# Patient Record
Sex: MALE | Race: WHITE | NOT HISPANIC OR LATINO | Employment: FULL TIME | URBAN - METROPOLITAN AREA
[De-identification: names, ages, dates, MRNs, and addresses within clinical notes are randomized per-mention and may not be internally consistent; named-entity substitution may affect disease eponyms.]

---

## 2021-01-09 ENCOUNTER — APPOINTMENT (OUTPATIENT)
Dept: CT IMAGING | Facility: CLINIC | Age: 26
DRG: 439 | End: 2021-01-09
Attending: HOSPITALIST
Payer: COMMERCIAL

## 2021-01-09 ENCOUNTER — APPOINTMENT (OUTPATIENT)
Dept: ULTRASOUND IMAGING | Facility: CLINIC | Age: 26
DRG: 439 | End: 2021-01-09
Attending: EMERGENCY MEDICINE
Payer: COMMERCIAL

## 2021-01-09 ENCOUNTER — HOSPITAL ENCOUNTER (INPATIENT)
Facility: CLINIC | Age: 26
LOS: 3 days | Discharge: HOME | DRG: 439 | End: 2021-01-12
Attending: EMERGENCY MEDICINE | Admitting: HOSPITALIST
Payer: COMMERCIAL

## 2021-01-09 DIAGNOSIS — K85.90 ACUTE PANCREATITIS, UNSPECIFIED COMPLICATION STATUS, UNSPECIFIED PANCREATITIS TYPE: ICD-10-CM

## 2021-01-09 DIAGNOSIS — F32.2 CURRENT SEVERE EPISODE OF MAJOR DEPRESSIVE DISORDER WITHOUT PSYCHOTIC FEATURES WITHOUT PRIOR EPISODE (H): Primary | ICD-10-CM

## 2021-01-09 LAB
ALBUMIN SERPL-MCNC: 4.6 G/DL (ref 3.4–5)
ALBUMIN UR-MCNC: NEGATIVE MG/DL
ALP SERPL-CCNC: 126 U/L (ref 40–150)
ALT SERPL W P-5'-P-CCNC: 145 U/L (ref 0–70)
ANION GAP SERPL CALCULATED.3IONS-SCNC: 6 MMOL/L (ref 3–14)
APPEARANCE UR: CLEAR
AST SERPL W P-5'-P-CCNC: 126 U/L (ref 0–45)
BASOPHILS # BLD AUTO: 0.1 10E9/L (ref 0–0.2)
BASOPHILS NFR BLD AUTO: 0.5 %
BILIRUB SERPL-MCNC: 0.7 MG/DL (ref 0.2–1.3)
BILIRUB UR QL STRIP: NEGATIVE
BUN SERPL-MCNC: 8 MG/DL (ref 7–30)
CALCIUM SERPL-MCNC: 10.2 MG/DL (ref 8.5–10.1)
CHLORIDE SERPL-SCNC: 102 MMOL/L (ref 94–109)
CO2 SERPL-SCNC: 31 MMOL/L (ref 20–32)
COLOR UR AUTO: YELLOW
CREAT SERPL-MCNC: 0.82 MG/DL (ref 0.66–1.25)
DIFFERENTIAL METHOD BLD: ABNORMAL
EOSINOPHIL # BLD AUTO: 0 10E9/L (ref 0–0.7)
EOSINOPHIL NFR BLD AUTO: 0 %
ERYTHROCYTE [DISTWIDTH] IN BLOOD BY AUTOMATED COUNT: 11.7 % (ref 10–15)
FLUAV RNA RESP QL NAA+PROBE: NEGATIVE
FLUBV RNA RESP QL NAA+PROBE: NEGATIVE
GFR SERPL CREATININE-BSD FRML MDRD: >90 ML/MIN/{1.73_M2}
GLUCOSE SERPL-MCNC: 129 MG/DL (ref 70–99)
GLUCOSE UR STRIP-MCNC: NEGATIVE MG/DL
HCT VFR BLD AUTO: 46.8 % (ref 40–53)
HGB BLD-MCNC: 16.1 G/DL (ref 13.3–17.7)
HGB UR QL STRIP: NEGATIVE
HYALINE CASTS #/AREA URNS LPF: 1 /LPF (ref 0–2)
IMM GRANULOCYTES # BLD: 0 10E9/L (ref 0–0.4)
IMM GRANULOCYTES NFR BLD: 0.3 %
KETONES UR STRIP-MCNC: 10 MG/DL
LABORATORY COMMENT REPORT: NORMAL
LEUKOCYTE ESTERASE UR QL STRIP: NEGATIVE
LIPASE SERPL-CCNC: 6918 U/L (ref 73–393)
LYMPHOCYTES # BLD AUTO: 1 10E9/L (ref 0.8–5.3)
LYMPHOCYTES NFR BLD AUTO: 7.8 %
MAGNESIUM SERPL-MCNC: 1.6 MG/DL (ref 1.6–2.3)
MCH RBC QN AUTO: 33.8 PG (ref 26.5–33)
MCHC RBC AUTO-ENTMCNC: 34.4 G/DL (ref 31.5–36.5)
MCV RBC AUTO: 98 FL (ref 78–100)
MONOCYTES # BLD AUTO: 0.5 10E9/L (ref 0–1.3)
MONOCYTES NFR BLD AUTO: 4.4 %
MUCOUS THREADS #/AREA URNS LPF: PRESENT /LPF
NEUTROPHILS # BLD AUTO: 10.7 10E9/L (ref 1.6–8.3)
NEUTROPHILS NFR BLD AUTO: 87 %
NITRATE UR QL: NEGATIVE
NRBC # BLD AUTO: 0 10*3/UL
NRBC BLD AUTO-RTO: 0 /100
PH UR STRIP: 6 PH (ref 5–7)
PHOSPHATE SERPL-MCNC: 4.2 MG/DL (ref 2.5–4.5)
PLATELET # BLD AUTO: 272 10E9/L (ref 150–450)
POTASSIUM SERPL-SCNC: 4.3 MMOL/L (ref 3.4–5.3)
PROT SERPL-MCNC: 8 G/DL (ref 6.8–8.8)
RBC # BLD AUTO: 4.77 10E12/L (ref 4.4–5.9)
RBC #/AREA URNS AUTO: <1 /HPF (ref 0–2)
RSV RNA SPEC QL NAA+PROBE: NORMAL
SARS-COV-2 RNA RESP QL NAA+PROBE: NEGATIVE
SODIUM SERPL-SCNC: 139 MMOL/L (ref 133–144)
SOURCE: ABNORMAL
SP GR UR STRIP: 1.01 (ref 1–1.03)
SPECIMEN SOURCE: NORMAL
UROBILINOGEN UR STRIP-MCNC: 0 MG/DL (ref 0–2)
WBC # BLD AUTO: 12.3 10E9/L (ref 4–11)
WBC #/AREA URNS AUTO: 0 /HPF (ref 0–5)

## 2021-01-09 PROCEDURE — 250N000009 HC RX 250: Performed by: EMERGENCY MEDICINE

## 2021-01-09 PROCEDURE — 96375 TX/PRO/DX INJ NEW DRUG ADDON: CPT

## 2021-01-09 PROCEDURE — 99222 1ST HOSP IP/OBS MODERATE 55: CPT | Performed by: HOSPITALIST

## 2021-01-09 PROCEDURE — 96361 HYDRATE IV INFUSION ADD-ON: CPT

## 2021-01-09 PROCEDURE — 83690 ASSAY OF LIPASE: CPT | Performed by: EMERGENCY MEDICINE

## 2021-01-09 PROCEDURE — 87636 SARSCOV2 & INF A&B AMP PRB: CPT | Performed by: EMERGENCY MEDICINE

## 2021-01-09 PROCEDURE — C9803 HOPD COVID-19 SPEC COLLECT: HCPCS

## 2021-01-09 PROCEDURE — 99285 EMERGENCY DEPT VISIT HI MDM: CPT | Mod: 25

## 2021-01-09 PROCEDURE — 84100 ASSAY OF PHOSPHORUS: CPT | Performed by: HOSPITALIST

## 2021-01-09 PROCEDURE — 80053 COMPREHEN METABOLIC PANEL: CPT | Performed by: EMERGENCY MEDICINE

## 2021-01-09 PROCEDURE — 96374 THER/PROPH/DIAG INJ IV PUSH: CPT

## 2021-01-09 PROCEDURE — 258N000003 HC RX IP 258 OP 636: Performed by: HOSPITALIST

## 2021-01-09 PROCEDURE — 250N000013 HC RX MED GY IP 250 OP 250 PS 637: Performed by: EMERGENCY MEDICINE

## 2021-01-09 PROCEDURE — 96376 TX/PRO/DX INJ SAME DRUG ADON: CPT

## 2021-01-09 PROCEDURE — 76705 ECHO EXAM OF ABDOMEN: CPT

## 2021-01-09 PROCEDURE — 81001 URINALYSIS AUTO W/SCOPE: CPT | Performed by: EMERGENCY MEDICINE

## 2021-01-09 PROCEDURE — 74177 CT ABD & PELVIS W/CONTRAST: CPT

## 2021-01-09 PROCEDURE — 250N000011 HC RX IP 250 OP 636: Performed by: HOSPITALIST

## 2021-01-09 PROCEDURE — 36415 COLL VENOUS BLD VENIPUNCTURE: CPT | Performed by: HOSPITALIST

## 2021-01-09 PROCEDURE — 99207 PR CDG-CODE CATEGORY CHANGED: CPT | Performed by: HOSPITALIST

## 2021-01-09 PROCEDURE — 85025 COMPLETE CBC W/AUTO DIFF WBC: CPT | Performed by: EMERGENCY MEDICINE

## 2021-01-09 PROCEDURE — 250N000009 HC RX 250: Performed by: HOSPITALIST

## 2021-01-09 PROCEDURE — 120N000001 HC R&B MED SURG/OB

## 2021-01-09 PROCEDURE — 83735 ASSAY OF MAGNESIUM: CPT | Performed by: HOSPITALIST

## 2021-01-09 PROCEDURE — HZ2ZZZZ DETOXIFICATION SERVICES FOR SUBSTANCE ABUSE TREATMENT: ICD-10-PCS | Performed by: HOSPITALIST

## 2021-01-09 PROCEDURE — 250N000011 HC RX IP 250 OP 636: Performed by: EMERGENCY MEDICINE

## 2021-01-09 PROCEDURE — 258N000003 HC RX IP 258 OP 636: Performed by: EMERGENCY MEDICINE

## 2021-01-09 PROCEDURE — 250N000013 HC RX MED GY IP 250 OP 250 PS 637: Performed by: HOSPITALIST

## 2021-01-09 RX ORDER — OLANZAPINE 5 MG/1
5-10 TABLET, ORALLY DISINTEGRATING ORAL EVERY 6 HOURS PRN
Status: DISCONTINUED | OUTPATIENT
Start: 2021-01-09 | End: 2021-01-12 | Stop reason: HOSPADM

## 2021-01-09 RX ORDER — HYDROCODONE BITARTRATE AND ACETAMINOPHEN 5; 325 MG/1; MG/1
1-2 TABLET ORAL EVERY 4 HOURS PRN
Status: DISCONTINUED | OUTPATIENT
Start: 2021-01-09 | End: 2021-01-12 | Stop reason: HOSPADM

## 2021-01-09 RX ORDER — FOLIC ACID 1 MG/1
1 TABLET ORAL DAILY
Status: DISCONTINUED | OUTPATIENT
Start: 2021-01-10 | End: 2021-01-12 | Stop reason: HOSPADM

## 2021-01-09 RX ORDER — NALOXONE HYDROCHLORIDE 0.4 MG/ML
0.4 INJECTION, SOLUTION INTRAMUSCULAR; INTRAVENOUS; SUBCUTANEOUS
Status: DISCONTINUED | OUTPATIENT
Start: 2021-01-09 | End: 2021-01-12 | Stop reason: HOSPADM

## 2021-01-09 RX ORDER — NALOXONE HYDROCHLORIDE 0.4 MG/ML
0.2 INJECTION, SOLUTION INTRAMUSCULAR; INTRAVENOUS; SUBCUTANEOUS
Status: DISCONTINUED | OUTPATIENT
Start: 2021-01-09 | End: 2021-01-12 | Stop reason: HOSPADM

## 2021-01-09 RX ORDER — SODIUM CHLORIDE 9 MG/ML
INJECTION, SOLUTION INTRAVENOUS CONTINUOUS
Status: DISCONTINUED | OUTPATIENT
Start: 2021-01-09 | End: 2021-01-09

## 2021-01-09 RX ORDER — ACETAMINOPHEN 325 MG/1
650 TABLET ORAL EVERY 4 HOURS PRN
Status: DISCONTINUED | OUTPATIENT
Start: 2021-01-09 | End: 2021-01-11

## 2021-01-09 RX ORDER — LANOLIN ALCOHOL/MO/W.PET/CERES
100 CREAM (GRAM) TOPICAL 3 TIMES DAILY
Status: DISCONTINUED | OUTPATIENT
Start: 2021-01-11 | End: 2021-01-12 | Stop reason: HOSPADM

## 2021-01-09 RX ORDER — IOPAMIDOL 755 MG/ML
81 INJECTION, SOLUTION INTRAVASCULAR ONCE
Status: COMPLETED | OUTPATIENT
Start: 2021-01-09 | End: 2021-01-09

## 2021-01-09 RX ORDER — PROCHLORPERAZINE MALEATE 5 MG
10 TABLET ORAL EVERY 6 HOURS PRN
Status: DISCONTINUED | OUTPATIENT
Start: 2021-01-09 | End: 2021-01-12 | Stop reason: HOSPADM

## 2021-01-09 RX ORDER — PROCHLORPERAZINE 25 MG
25 SUPPOSITORY, RECTAL RECTAL EVERY 12 HOURS PRN
Status: DISCONTINUED | OUTPATIENT
Start: 2021-01-09 | End: 2021-01-12 | Stop reason: HOSPADM

## 2021-01-09 RX ORDER — MORPHINE SULFATE 4 MG/ML
4 INJECTION, SOLUTION INTRAMUSCULAR; INTRAVENOUS ONCE
Status: COMPLETED | OUTPATIENT
Start: 2021-01-09 | End: 2021-01-09

## 2021-01-09 RX ORDER — HYDROMORPHONE HYDROCHLORIDE 1 MG/ML
0.3 INJECTION, SOLUTION INTRAMUSCULAR; INTRAVENOUS; SUBCUTANEOUS
Status: DISCONTINUED | OUTPATIENT
Start: 2021-01-09 | End: 2021-01-09

## 2021-01-09 RX ORDER — AMOXICILLIN 250 MG
2 CAPSULE ORAL 2 TIMES DAILY PRN
Status: DISCONTINUED | OUTPATIENT
Start: 2021-01-09 | End: 2021-01-12 | Stop reason: HOSPADM

## 2021-01-09 RX ORDER — POLYETHYLENE GLYCOL 3350 17 G/17G
17 POWDER, FOR SOLUTION ORAL DAILY PRN
Status: DISCONTINUED | OUTPATIENT
Start: 2021-01-09 | End: 2021-01-12 | Stop reason: HOSPADM

## 2021-01-09 RX ORDER — SODIUM CHLORIDE 9 MG/ML
1000 INJECTION, SOLUTION INTRAVENOUS CONTINUOUS
Status: DISCONTINUED | OUTPATIENT
Start: 2021-01-09 | End: 2021-01-09

## 2021-01-09 RX ORDER — ONDANSETRON 2 MG/ML
4 INJECTION INTRAMUSCULAR; INTRAVENOUS EVERY 6 HOURS PRN
Status: DISCONTINUED | OUTPATIENT
Start: 2021-01-09 | End: 2021-01-12 | Stop reason: HOSPADM

## 2021-01-09 RX ORDER — ACETAMINOPHEN 650 MG/1
650 SUPPOSITORY RECTAL EVERY 4 HOURS PRN
Status: DISCONTINUED | OUTPATIENT
Start: 2021-01-09 | End: 2021-01-12 | Stop reason: HOSPADM

## 2021-01-09 RX ORDER — SODIUM CHLORIDE 9 MG/ML
INJECTION, SOLUTION INTRAVENOUS CONTINUOUS
Status: DISCONTINUED | OUTPATIENT
Start: 2021-01-09 | End: 2021-01-12 | Stop reason: HOSPADM

## 2021-01-09 RX ORDER — LORAZEPAM 2 MG/ML
1-2 INJECTION INTRAMUSCULAR EVERY 30 MIN PRN
Status: DISCONTINUED | OUTPATIENT
Start: 2021-01-09 | End: 2021-01-12 | Stop reason: HOSPADM

## 2021-01-09 RX ORDER — FLUMAZENIL 0.1 MG/ML
0.2 INJECTION, SOLUTION INTRAVENOUS
Status: DISCONTINUED | OUTPATIENT
Start: 2021-01-09 | End: 2021-01-12 | Stop reason: HOSPADM

## 2021-01-09 RX ORDER — LANOLIN ALCOHOL/MO/W.PET/CERES
200 CREAM (GRAM) TOPICAL 3 TIMES DAILY
Status: COMPLETED | OUTPATIENT
Start: 2021-01-09 | End: 2021-01-11

## 2021-01-09 RX ORDER — LIDOCAINE 40 MG/G
CREAM TOPICAL
Status: DISCONTINUED | OUTPATIENT
Start: 2021-01-09 | End: 2021-01-12 | Stop reason: HOSPADM

## 2021-01-09 RX ORDER — HALOPERIDOL 5 MG/ML
2.5-5 INJECTION INTRAMUSCULAR EVERY 6 HOURS PRN
Status: DISCONTINUED | OUTPATIENT
Start: 2021-01-09 | End: 2021-01-12 | Stop reason: HOSPADM

## 2021-01-09 RX ORDER — AMOXICILLIN 250 MG
1 CAPSULE ORAL 2 TIMES DAILY PRN
Status: DISCONTINUED | OUTPATIENT
Start: 2021-01-09 | End: 2021-01-12 | Stop reason: HOSPADM

## 2021-01-09 RX ORDER — LANOLIN ALCOHOL/MO/W.PET/CERES
100 CREAM (GRAM) TOPICAL DAILY
Status: DISCONTINUED | OUTPATIENT
Start: 2021-01-17 | End: 2021-01-12 | Stop reason: HOSPADM

## 2021-01-09 RX ORDER — LORAZEPAM 1 MG/1
1-2 TABLET ORAL EVERY 30 MIN PRN
Status: DISCONTINUED | OUTPATIENT
Start: 2021-01-09 | End: 2021-01-12 | Stop reason: HOSPADM

## 2021-01-09 RX ORDER — MULTIPLE VITAMINS W/ MINERALS TAB 9MG-400MCG
1 TAB ORAL DAILY
Status: DISCONTINUED | OUTPATIENT
Start: 2021-01-10 | End: 2021-01-12 | Stop reason: HOSPADM

## 2021-01-09 RX ORDER — ONDANSETRON 4 MG/1
4 TABLET, ORALLY DISINTEGRATING ORAL EVERY 6 HOURS PRN
Status: DISCONTINUED | OUTPATIENT
Start: 2021-01-09 | End: 2021-01-12 | Stop reason: HOSPADM

## 2021-01-09 RX ORDER — HYDROMORPHONE HYDROCHLORIDE 1 MG/ML
0.5 INJECTION, SOLUTION INTRAMUSCULAR; INTRAVENOUS; SUBCUTANEOUS EVERY 10 MIN PRN
Status: DISCONTINUED | OUTPATIENT
Start: 2021-01-09 | End: 2021-01-09

## 2021-01-09 RX ORDER — HYDROMORPHONE HYDROCHLORIDE 1 MG/ML
.5-1 INJECTION, SOLUTION INTRAMUSCULAR; INTRAVENOUS; SUBCUTANEOUS
Status: DISPENSED | OUTPATIENT
Start: 2021-01-09 | End: 2021-01-10

## 2021-01-09 RX ADMIN — SODIUM CHLORIDE 1000 ML: 9 INJECTION, SOLUTION INTRAVENOUS at 14:34

## 2021-01-09 RX ADMIN — HYDROMORPHONE HYDROCHLORIDE 0.3 MG: 1 INJECTION, SOLUTION INTRAMUSCULAR; INTRAVENOUS; SUBCUTANEOUS at 20:15

## 2021-01-09 RX ADMIN — ONDANSETRON 4 MG: 2 INJECTION INTRAMUSCULAR; INTRAVENOUS at 19:41

## 2021-01-09 RX ADMIN — HYDROMORPHONE HYDROCHLORIDE 0.5 MG: 1 INJECTION, SOLUTION INTRAMUSCULAR; INTRAVENOUS; SUBCUTANEOUS at 21:36

## 2021-01-09 RX ADMIN — LIDOCAINE HYDROCHLORIDE 30 ML: 20 SOLUTION ORAL; TOPICAL at 14:37

## 2021-01-09 RX ADMIN — IOPAMIDOL 81 ML: 755 INJECTION, SOLUTION INTRAVENOUS at 21:48

## 2021-01-09 RX ADMIN — SODIUM CHLORIDE: 9 INJECTION, SOLUTION INTRAVENOUS at 19:19

## 2021-01-09 RX ADMIN — HYDROMORPHONE HYDROCHLORIDE 0.5 MG: 1 INJECTION, SOLUTION INTRAMUSCULAR; INTRAVENOUS; SUBCUTANEOUS at 15:44

## 2021-01-09 RX ADMIN — SODIUM CHLORIDE 63 ML: 9 INJECTION, SOLUTION INTRAVENOUS at 21:48

## 2021-01-09 RX ADMIN — HYDROMORPHONE HYDROCHLORIDE 0.5 MG: 1 INJECTION, SOLUTION INTRAMUSCULAR; INTRAVENOUS; SUBCUTANEOUS at 17:19

## 2021-01-09 RX ADMIN — SODIUM CHLORIDE: 9 INJECTION, SOLUTION INTRAVENOUS at 18:26

## 2021-01-09 RX ADMIN — THIAMINE HCL TAB 100 MG 200 MG: 100 TAB at 21:36

## 2021-01-09 RX ADMIN — MORPHINE SULFATE 4 MG: 4 INJECTION, SOLUTION INTRAMUSCULAR; INTRAVENOUS at 15:12

## 2021-01-09 RX ADMIN — HYDROCODONE BITARTRATE AND ACETAMINOPHEN 1 TABLET: 5; 325 TABLET ORAL at 19:22

## 2021-01-09 RX ADMIN — SODIUM CHLORIDE 1000 ML: 9 INJECTION, SOLUTION INTRAVENOUS at 15:48

## 2021-01-09 ASSESSMENT — ENCOUNTER SYMPTOMS
SHORTNESS OF BREATH: 1
HEMATURIA: 0
DIARRHEA: 1
LIGHT-HEADEDNESS: 0
DYSURIA: 0
FREQUENCY: 0
NAUSEA: 1
VOMITING: 1
FEVER: 0
ABDOMINAL PAIN: 1

## 2021-01-09 ASSESSMENT — MIFFLIN-ST. JEOR: SCORE: 1701.14

## 2021-01-09 ASSESSMENT — ACTIVITIES OF DAILY LIVING (ADL): ADLS_ACUITY_SCORE: 17

## 2021-01-09 NOTE — ED NOTES
[facility]  ED Nurse Handoff Report    ED Chief complaint: Abdominal Pain      ED Diagnosis:   Final diagnoses:   Acute pancreatitis, unspecified complication status, unspecified pancreatitis type       Code Status: Full Code    Allergies: No Known Allergies    Patient Story: arrived to ED w/ vomiting and abdominal pain  Focused Assessment:  VSS on R/A. Epigastric pain and vomiting since Thursday, getting worse each day. Respiratory, Neuro and Cardiac exams WNL. Pt states he has been drinking more than usual over the last few days. Plan to admit for pancreatitis and increased LFT's.    Treatments and/or interventions provided: 2 L NS bolus, 4 mg morphine IV x1, 0.5 mg dilaudid IV x2, GI cocktail x1  Patient's response to treatments and/or interventions: pain somewhat reduced    To be done/followed up on inpatient unit:  NEEDS UA    Does this patient have any cognitive concerns?: NA    Activity level - Baseline/Home:  Independent  Activity Level - Current:   Independent    Patient's Preferred language: English   Needed?: No    Isolation: None  Infection: Not Applicable  Patient tested for COVID 19 prior to admission: YES  Bariatric?: No    Vital Signs:   Vitals:    01/09/21 1545 01/09/21 1550 01/09/21 1555 01/09/21 1600   BP:       Pulse:       Resp:       Temp:       TempSrc:       SpO2: 100% 100% 100% 100%   Weight:       Height:           Cardiac Rhythm:     Was the PSS-3 completed:   Yes  What interventions are required if any?               Family Comments: NA  OBS brochure/video discussed/provided to patient/family: N/A              Name of person given brochure if not patient: NA              Relationship to patient: NA    For the majority of the shift this patient's behavior was Green.   Behavioral interventions performed were NA.    ED NURSE PHONE NUMBER: [phone]

## 2021-01-09 NOTE — H&P
[facility]    History and Physical - Hospitalist Service       Date of Admission:  1/9/2021    Assessment & Plan   Lloyd Dillon is a 25 year old male with minimal past medical history who presented to the ER with abdominal pain, nausea and vomiting.  He has been found to have acute pancreatitis likely secondary to alcohol abuse.      Acute pancreatitis  Likely in the context of alcohol use which has increased to 4-5 days a week in the past several months during covid.  Reports 3-5 drinks per night when he is drinking and states he has essentially nothing to do.  Denies thoughts of self harm or suicidal ideation but has felt down/depressed at times.  Denies any other drug or substance use.  On admission lipase 6918 and  .  Ultrasound formal reading pending.  Slight leukocytosis of 12.3.  Calcium 10.2. Given morphine and dilaudid in ED as well as IVF with some improvement of symptoms.  - Continue IV fluid with NS at 150/hr -- increased to 200/hr given low output so far  - monitor in/out - ensure adequate urine output  - ice chips/sips of water for now, can advance to clears if tolerated later tonight or in the morning  - As needed analgesia and antiemetics  - Discussed and encouraged alcohol cessation - patient agreeable to psychiatry consultation (ordered)  - with concern of alcohol use for most days a week for last several months, will add CIWA protocol and telemetry  --- 21:15 - continued/worsening pain, will order STAT CT abdo/pelvis with contrast    COVID-19 asymptomatic screening  - PCR pending from ED, no indication for special precautions      Diet: NPO for Medical/Clinical Reasons Except for: Ice Chips    DVT Prophylaxis: Pneumatic Compression Devices and Ambulate every shift  Griffith Catheter: not present  Code Status:   Full Code         Disposition Plan   Expected discharge: 2-3 days pending improvement of symptoms and toleration of diet  Entered: Bebo Healy,  MD 01/09/2021, 4:38 PM     The patient's care was discussed with the Bedside Nurse, Patient and ED MD.    Bebo Healy MD  [facility]  Contact information available via [facility] Paging/Directory      ______________________________________________________________________    Chief Complaint   Abdominal pain, nausea, and vomiting    History is obtained from the patient    History of Present Illness   Lloyd Dillon is a 25 year old male with minimal past medical history who presented to the ED with abdominal pain, nausea, and vomiting.  3-4 days ago patient noted sharp epigastric pain associated with nausea and some intermittent vomiting.  At first he thought this was due to food poisoning in relation to eating some pasta at home.  He subsequently developed a couple bouts of loose stool but nothing persistent.  Pain worsens when he lies down and does not improve after vomiting/dry heaving.  He does endorse drinking alcohol up to 4-5 days a week since COVID began as he has nothing to do.  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  He denies suicidal ideation but has been feeling down at times over the past several months.  He denies fevers, chills, cough, shortness of breath (although he did report some shortness of breath in the ED felt to be likely secondary to pain), loss of taste or smell, headache, vision changes, sore throat, chest pain, palpitations, burning with urination, increased urinary frequency, blood in the urine, or lightheadedness.    In the ED he was seen by Dr Clark with whom I discussed the case.  Patient is afebrile, hemodynamically stable, and saturating normally on RA.  CMP notable for calcium 10.2 and CBC notable for WBC 12.  Lipase 6,918.  US showed Fatty infiltration of liver. No gallstones or bile duct dilatation. Right kidney unremarkable.  He has received IVF, dilaudid, morphine, and GI cocktail with mild improvement. Asymptomatic  COVID 19 screening pending. Patient will be admitted for further treatment and monitoring.       Review of Systems    The 10 point Review of Systems is negative other than noted in the HPI or here.     Past Medical History    I have reviewed this patient's medical history and updated it with pertinent information if needed.   Past Medical History:   Diagnosis Date     Uncomplicated asthma        Past Surgical History   I have reviewed this patient's surgical history and updated it with pertinent information if needed.  History reviewed. No pertinent surgical history.    Social History   I have reviewed this patient's social history and updated it with pertinent information if needed.  Social History     Tobacco Use     Smoking status: Never Smoker     Smokeless tobacco: Never Used   Substance Use Topics     Alcohol use: Yes     Comment: social     Drug use: None       Family History     No known pancreatic problems in family.   Grandparent had a bowel resection for malignancy in 2012.      Prior to Admission Medications   None     Allergies   No Known Allergies    Physical Exam   Vital Signs: Temp: 97.5  F (36.4  C) Temp src: Oral BP: 136/85 Pulse: 103   Resp: 18 SpO2: 100 % O2 Device: None (Room air)    Weight: 160 lbs 0 oz    Gen: NAD, pleasant  HEENT: Normocephalic, EOMI, MMM  Resp: no crackles,  no wheezes, no increased work of resp  CV: S1S2 heard, reg rhythm, reg rate, no pedal edema  Abdo: soft, tender to palpation in lower epigastrium, no CVAT, no rebound, nondistended, bowel sounds present  Ext: calves nontender, well perfused  Neuro: AAOx3, CN grossly intact, no facial asymmetry  Denies suicidal ideation      Data   Data reviewed today: I reviewed all medications, new labs and imaging results over the last 24 hours. I personally reviewed no images or EKG's today.    Recent Labs   Lab 01/09/21  1431   WBC 12.3*   HGB 16.1   MCV 98         POTASSIUM 4.3   CHLORIDE 102   CO2 31   BUN 8   CR 0.82    ANIONGAP 6   QUANG 10.2*   *   ALBUMIN 4.6   PROTTOTAL 8.0   BILITOTAL 0.7   ALKPHOS 126   *   *   LIPASE 6,918*     Recent Results (from the past 24 hour(s))   Abdomen US, limited (RUQ only)    Narrative    US ABDOMEN LIMITED 1/9/2021 4:28 PM    CLINICAL HISTORY: Pancreatitis.    TECHNIQUE: Limited abdominal ultrasound.    COMPARISON: None.    FINDINGS:    GALLBLADDER: The gallbladder is normal. No gallstones, wall  thickening, or pericholecystic fluid. Negative sonographic Acosta's  sign.    BILE DUCTS: There is no biliary dilatation. The common duct measures 3  mm.    LIVER: The liver is increased in echogenicity without focal mass.     RIGHT KIDNEY: Unremarkable.    PANCREAS: The visualized portions of the pancreas are normal.    No ascites.      Impression    IMPRESSION: Fatty infiltration of liver. No gallstones or bile duct  dilatation. Right kidney is unremarkable.

## 2021-01-09 NOTE — ED PROVIDER NOTES
History   Chief Complaint:  Abdominal Pain       HPI   Lloyd Dillon is a 25 year old male who presents for evaluation of abdominal pain. On 1/6/2021 the patient started to develop sharp epigastric abdominal pain with associated nausea and vomiting. He initially attributed his symptoms to food poisoning after eating some pasta at home. Since then he has also developed some diarrhea. His pain does not improve after vomiting and it worsens when laying flat. Due to his persistent pain today, the patient came into the ED for evaluation. Here in the ED, the patient also complained of some shortness of breath. He denies any recent fever, dysuria, hematuria, urinary frequency, or lightheadedness. He notes that he drinks alcohol socially but denies heavy drinking. He has never had similar pain. He denies any history of abdominal surgeries.      Review of Systems   Constitutional: Negative for fever.   Respiratory: Positive for shortness of breath.    Gastrointestinal: Positive for abdominal pain, diarrhea, nausea and vomiting.   Genitourinary: Negative for dysuria, frequency and hematuria.   Neurological: Negative for light-headedness.   All other systems reviewed and are negative.    Allergies:  No Known Allergies     Medications:  Minocycline   Differin   Clindamycin lotion     Past Medical History:    Asthma       Past Surgical History:    The patient denies any history of abdominal surgeries.       Social History:  The patient presents to the ED alone.   The patient reports that he drinks alcohol socially and denies heavy drinking.     Physical Exam     Patient Vitals for the past 24 hrs:   BP Temp Temp src Pulse Resp SpO2 Height Weight   01/09/21 1700 135/78 -- -- 79 -- 95 % -- --   01/09/21 1645 117/85 -- -- 96 -- 100 % -- --   01/09/21 1600 -- -- -- -- -- 100 % -- --   01/09/21 1555 -- -- -- -- -- 100 % -- --   01/09/21 1550 -- -- -- -- -- 100 % -- --   01/09/21 1545 -- -- -- -- -- 100 % -- --   01/09/21 1540 -- --  "-- -- -- 100 % -- --   01/09/21 1535 -- -- -- -- -- 100 % -- --   01/09/21 1530 -- -- -- -- -- 100 % -- --   01/09/21 1525 -- -- -- -- -- 99 % -- --   01/09/21 1520 -- -- -- -- -- 100 % -- --   01/09/21 1515 136/85 -- -- 103 -- 98 % -- --   01/09/21 1422 -- -- -- -- -- -- 1.753 m (5' 9\") 72.6 kg (160 lb)   01/09/21 1421 -- 97.5  F (36.4  C) Oral -- -- -- -- --   01/09/21 1354 -- -- -- -- 18 98 % -- --   01/09/21 1353 101/63 -- -- 73 -- -- -- --       Physical Exam  Physical Exam   Nursing note and vitals reviewed.  General: Oriented to person, place, and time. Appears well-developed and well-nourished. Sitting up in the bed curling his abdomen, says it hurts more when he lays flat.   Head: No signs of trauma.   Mouth/Throat: Oropharynx is clear and moist.   Eyes: Conjunctivae are normal. Pupils are equal, round, and reactive to light.   Neck: Normal range of motion. No nuchal rigidity.   Cardiovascular: Normal rate and regular rhythm.    Respiratory: Effort normal and breath sounds normal. No respiratory distress.   Abdominal: Soft. Maximal pain in the epigastric area. There is no guarding.   Musculoskeletal: Normal range of motion. no edema.   Neurological: The patient is alert and oriented to person, place, and time.  PERRLA, EOMI, visual fields intact, strength in upper/lower extremities normal and symmetrical.   Sensation normal. Speech normal  GCS eye subscore is 4. GCS verbal subscore is 5. GCS motor subscore is 6.   Skin: Skin is warm and dry. No rash noted.   Psychiatric: normal mood and affect. behavior is normal.     Emergency Department Course     Imaging:  Abdomen US Limited (RUQ Only):  IMPRESSION: Fatty infiltration of liver. No gallstones or bile duct dilatation. Right kidney is unremarkable.   Per radiology.      Laboratory:  CBC: WBC 12.3 high, o/w WNL (HGB 16.1, )   CMP: Glucose 129 high, Calcium 10.2 high,  high,  high, o/w WNL (Creatinine 0.82)   Lipase: 6,918 high  "   Asymptomatic Influenza A/B & COVID-19 Virus PCR Multiplex: Pending   UA with Microscopic reflex to culture: Pending     Emergency Department Course:   Reviewed:  I reviewed nursing notes, vitals and past medical history    Assessments:  1442: I obtained history and examined the patient as noted above.   1515: I updated and reassessed the patient.      Consults:   1637: I spoke with Dr. Healy of the hospitalist service regarding patient's presentation, findings, and plan of care.      Interventions:  1434 NS 1,000 mL IV   1437 GI cocktail 30 mL PO   1512 Morphine 4 mg IV   1544 Dilaudid 0.5 mg IV   1548 NS 1,000 mL IV   1719 Dilaudid 0.5 mg IV     Disposition:  The patient was discharged to home.      Impression & Plan     Medical Decision Making:  Lloyd Dillon is a 25 year old male who presents with epigastric abdominal pain.  The differential diagnosis would include GERD, GIB, esophageal spasm, atypical cardiac sx's, pancreatitis, biliary colic or gallstone disease, AAA, gastroenteritis, gastritis, large vs small bowel disease, etc.  Based on history, PE and labs, the most likely explanation is pancreatitis.  Ultrasound of gallbladder shows no dilated CBD.    Abdominal exam is benign at this point.   Pain control in ED was started.   Admit for pain control and further evaluation.        Covid-19  Lloyd Dillon was evaluated during a global COVID-19 pandemic, which necessitated consideration that the patient might be at risk for infection with the SARS-CoV-2 virus that causes COVID-19.   Applicable protocols for evaluation were followed during the patient's care.   COVID-19 was considered as part of the patient's evaluation. The plan for testing is:  a test was obtained during this visit.      Diagnosis:    ICD-10-CM   1. Acute pancreatitis, unspecified complication status, unspecified pancreatitis type  K85.90      Scribe Disclosure:  Vinay KELLY, am serving as a scribe at 2:42 PM on 1/9/2021 to document  services personally performed by Dr. Clark, based on my observations and the provider's statements to me.            Juan Clark MD  01/09/21 [id number]

## 2021-01-09 NOTE — PROGRESS NOTES
RECEIVING UNIT ED HANDOFF REVIEW    ED Nurse Handoff Report was reviewed by: Rayne Segundo RN on January 9, 2021 at 5:08 PM

## 2021-01-09 NOTE — PHARMACY-ADMISSION MEDICATION HISTORY
Pharmacy Medication History  Admission medication history interview status for the 1/9/2021  admission is complete. See EPIC admission navigator for prior to admission medications       Medication history sources: Patient  Location of interview: Phone  Adherence Assessment: Good    Significant changes made to the medication list:  Deleted list      Additional medication history information:   Patient is not currently taking any medications     Medication reconciliation completed by provider prior to medication history? N/A    Time spent in this activity: 5 minutes      Prior to Admission medications    Not on File       The information provided in this note is only as accurate as the sources available at the time of the update(s).

## 2021-01-10 LAB
ALBUMIN SERPL-MCNC: 3.7 G/DL (ref 3.4–5)
ALP SERPL-CCNC: 83 U/L (ref 40–150)
ALT SERPL W P-5'-P-CCNC: 92 U/L (ref 0–70)
ANION GAP SERPL CALCULATED.3IONS-SCNC: 5 MMOL/L (ref 3–14)
AST SERPL W P-5'-P-CCNC: 57 U/L (ref 0–45)
BILIRUB SERPL-MCNC: 0.9 MG/DL (ref 0.2–1.3)
BUN SERPL-MCNC: 5 MG/DL (ref 7–30)
CALCIUM SERPL-MCNC: 8.9 MG/DL (ref 8.5–10.1)
CHLORIDE SERPL-SCNC: 104 MMOL/L (ref 94–109)
CO2 SERPL-SCNC: 29 MMOL/L (ref 20–32)
CREAT SERPL-MCNC: 0.7 MG/DL (ref 0.66–1.25)
ERYTHROCYTE [DISTWIDTH] IN BLOOD BY AUTOMATED COUNT: 11.9 % (ref 10–15)
GFR SERPL CREATININE-BSD FRML MDRD: >90 ML/MIN/{1.73_M2}
GLUCOSE SERPL-MCNC: 99 MG/DL (ref 70–99)
HCT VFR BLD AUTO: 44.2 % (ref 40–53)
HGB BLD-MCNC: 14.9 G/DL (ref 13.3–17.7)
LIPASE SERPL-CCNC: 9955 U/L (ref 73–393)
MAGNESIUM SERPL-MCNC: 1.8 MG/DL (ref 1.6–2.3)
MCH RBC QN AUTO: 33.6 PG (ref 26.5–33)
MCHC RBC AUTO-ENTMCNC: 33.7 G/DL (ref 31.5–36.5)
MCV RBC AUTO: 100 FL (ref 78–100)
PLATELET # BLD AUTO: 187 10E9/L (ref 150–450)
POTASSIUM SERPL-SCNC: 4 MMOL/L (ref 3.4–5.3)
PROT SERPL-MCNC: 6.5 G/DL (ref 6.8–8.8)
RBC # BLD AUTO: 4.43 10E12/L (ref 4.4–5.9)
SODIUM SERPL-SCNC: 138 MMOL/L (ref 133–144)
TRIGL SERPL-MCNC: 56 MG/DL
WBC # BLD AUTO: 10.7 10E9/L (ref 4–11)

## 2021-01-10 PROCEDURE — 250N000011 HC RX IP 250 OP 636: Performed by: INTERNAL MEDICINE

## 2021-01-10 PROCEDURE — C9113 INJ PANTOPRAZOLE SODIUM, VIA: HCPCS | Performed by: HOSPITALIST

## 2021-01-10 PROCEDURE — 85027 COMPLETE CBC AUTOMATED: CPT | Performed by: HOSPITALIST

## 2021-01-10 PROCEDURE — 250N000013 HC RX MED GY IP 250 OP 250 PS 637: Performed by: PSYCHIATRY & NEUROLOGY

## 2021-01-10 PROCEDURE — 250N000013 HC RX MED GY IP 250 OP 250 PS 637: Performed by: HOSPITALIST

## 2021-01-10 PROCEDURE — 250N000011 HC RX IP 250 OP 636: Performed by: HOSPITALIST

## 2021-01-10 PROCEDURE — 84478 ASSAY OF TRIGLYCERIDES: CPT | Performed by: HOSPITALIST

## 2021-01-10 PROCEDURE — 120N000001 HC R&B MED SURG/OB

## 2021-01-10 PROCEDURE — 258N000003 HC RX IP 258 OP 636: Performed by: HOSPITALIST

## 2021-01-10 PROCEDURE — 80053 COMPREHEN METABOLIC PANEL: CPT | Performed by: HOSPITALIST

## 2021-01-10 PROCEDURE — 36415 COLL VENOUS BLD VENIPUNCTURE: CPT | Performed by: HOSPITALIST

## 2021-01-10 PROCEDURE — 99222 1ST HOSP IP/OBS MODERATE 55: CPT | Mod: 95 | Performed by: PSYCHIATRY & NEUROLOGY

## 2021-01-10 PROCEDURE — 99233 SBSQ HOSP IP/OBS HIGH 50: CPT | Performed by: HOSPITALIST

## 2021-01-10 PROCEDURE — 83735 ASSAY OF MAGNESIUM: CPT | Performed by: HOSPITALIST

## 2021-01-10 PROCEDURE — 83690 ASSAY OF LIPASE: CPT | Performed by: HOSPITALIST

## 2021-01-10 RX ORDER — HYDROMORPHONE HYDROCHLORIDE 1 MG/ML
.5-1 INJECTION, SOLUTION INTRAMUSCULAR; INTRAVENOUS; SUBCUTANEOUS
Status: DISCONTINUED | OUTPATIENT
Start: 2021-01-10 | End: 2021-01-12 | Stop reason: HOSPADM

## 2021-01-10 RX ORDER — MIRTAZAPINE 15 MG/1
15 TABLET, FILM COATED ORAL AT BEDTIME
Status: DISCONTINUED | OUTPATIENT
Start: 2021-01-10 | End: 2021-01-12 | Stop reason: HOSPADM

## 2021-01-10 RX ADMIN — SODIUM CHLORIDE: 9 INJECTION, SOLUTION INTRAVENOUS at 01:13

## 2021-01-10 RX ADMIN — HYDROMORPHONE HYDROCHLORIDE 1 MG: 1 INJECTION, SOLUTION INTRAMUSCULAR; INTRAVENOUS; SUBCUTANEOUS at 00:33

## 2021-01-10 RX ADMIN — FOLIC ACID 1 MG: 1 TABLET ORAL at 08:37

## 2021-01-10 RX ADMIN — HYDROMORPHONE HYDROCHLORIDE 1 MG: 1 INJECTION, SOLUTION INTRAMUSCULAR; INTRAVENOUS; SUBCUTANEOUS at 06:46

## 2021-01-10 RX ADMIN — SODIUM CHLORIDE: 9 INJECTION, SOLUTION INTRAVENOUS at 21:50

## 2021-01-10 RX ADMIN — HYDROMORPHONE HYDROCHLORIDE 1 MG: 1 INJECTION, SOLUTION INTRAMUSCULAR; INTRAVENOUS; SUBCUTANEOUS at 09:51

## 2021-01-10 RX ADMIN — PANTOPRAZOLE SODIUM 40 MG: 40 INJECTION, POWDER, FOR SOLUTION INTRAVENOUS at 22:30

## 2021-01-10 RX ADMIN — HYDROMORPHONE HYDROCHLORIDE 1 MG: 1 INJECTION, SOLUTION INTRAMUSCULAR; INTRAVENOUS; SUBCUTANEOUS at 19:57

## 2021-01-10 RX ADMIN — SODIUM CHLORIDE: 9 INJECTION, SOLUTION INTRAVENOUS at 06:14

## 2021-01-10 RX ADMIN — MULTIPLE VITAMINS W/ MINERALS TAB 1 TABLET: TAB at 08:37

## 2021-01-10 RX ADMIN — THIAMINE HCL TAB 100 MG 200 MG: 100 TAB at 21:44

## 2021-01-10 RX ADMIN — SODIUM CHLORIDE: 9 INJECTION, SOLUTION INTRAVENOUS at 11:07

## 2021-01-10 RX ADMIN — HYDROCODONE BITARTRATE AND ACETAMINOPHEN 1 TABLET: 5; 325 TABLET ORAL at 13:46

## 2021-01-10 RX ADMIN — SODIUM CHLORIDE: 9 INJECTION, SOLUTION INTRAVENOUS at 16:45

## 2021-01-10 RX ADMIN — THIAMINE HCL TAB 100 MG 200 MG: 100 TAB at 08:37

## 2021-01-10 RX ADMIN — MIRTAZAPINE 15 MG: 15 TABLET, FILM COATED ORAL at 21:44

## 2021-01-10 RX ADMIN — HYDROMORPHONE HYDROCHLORIDE 1 MG: 1 INJECTION, SOLUTION INTRAMUSCULAR; INTRAVENOUS; SUBCUTANEOUS at 03:37

## 2021-01-10 RX ADMIN — THIAMINE HCL TAB 100 MG 200 MG: 100 TAB at 16:22

## 2021-01-10 ASSESSMENT — ACTIVITIES OF DAILY LIVING (ADL)
ADLS_ACUITY_SCORE: 16
ADLS_ACUITY_SCORE: 17

## 2021-01-10 ASSESSMENT — MIFFLIN-ST. JEOR: SCORE: 1762.82

## 2021-01-10 NOTE — PLAN OF CARE
DATE & TIME: 1/9/21, 1230 - 8194   Cognitive Concerns/ Orientation : A&O c 4   BEHAVIOR & AGGRESSION TOOL COLOR: Green  CIWA SCORE: 2   ABNL VS/O2: BP elevated. Other VSS on room air  MOBILITY: SBA with GB  PAIN MANAGMENT: Prn IV Dilaudid given for abdominal and back pain. Helpful  DIET: NPO ex ice chips and meds  BOWEL/BLADDER: Continent. No BM this shift  ABNL LAB/BG: AM labs pending  DRAIN/DEVICES: PIV infusing at 200 ml/hr  TELEMETRY RHYTHM: NSR  SKIN: WDL  TESTS/PROCEDURES: N/a  D/C DAY/GOALS/PLACE: Expected discharge in 2-3 days pending improvement of symptoms and toleration of diet per MD notes  OTHER IMPORTANT INFO: Psychiatry consulted

## 2021-01-10 NOTE — PLAN OF CARE
DATE & TIME: 1/10/21 [id number]  Cognitive Concerns/ Orientation : A&O x4   BEHAVIOR & AGGRESSION TOOL COLOR: Green  CIWA SCORE: 1 and 1 for slight tremor.      ABNL VS/O2: BP elevated. Other VSS on room air  MOBILITY: Ind  PAIN MANAGMENT: C/o abdominal and back pain as needed Dilaudid given x1, PRN Norco 1 tab given x1 with relief.    DIET: NPO ex ice chips and meds  BOWEL/BLADDER: Continent. No BM this shift  ABNL LAB/BG: LFTs improved, Lipase 9,995  DRAIN/DEVICES: PIV infusing at 200 ml/hr  TELEMETRY RHYTHM: SD, MD paged.   SKIN: WDL  TESTS/PROCEDURES: N/a  D/C DAY/GOALS/PLACE: Expected discharge in 2-3 days pending improvement of symptoms and toleration of diet per MD notes  OTHER IMPORTANT INFO: Psych consulted, started on Remeron. C/o groin pain, urology consulted.

## 2021-01-10 NOTE — PROVIDER NOTIFICATION
MD Notification    Notified Person: MD    Notified Person Name: Rhoda    Notification Date/Time: 1/10 8:48 am    Notification Interaction: Web based page    Purpose of Notification: Pt on tele but no orders. His tele this AM was sinus dysthymia, heart beats slightly irregular.     Orders Received: Tele ordered.     Comments:

## 2021-01-10 NOTE — CONSULTS
[facility]    PSYCHIATRIC CONSULTATION     Date of Admission:  1/9/2021  Requesting Provider: Bebo Healy MD    HPI:  Lloyd Dillon is a 25 year old male with no past psychiatric history, admitted to the hospital after presenting to the ED with abdominal pain, N/V. He conceded to having escalated drinking since COVID emerged and notes drinking on the order of 4-5 days per week (5-8 beers at a sitting) attributed to not having much else to do. Did note perhaps feeling down at times over the past several months, though denied any SI or other safety concerns. Medical work-up revealed pancreatitis and he's currently being managed in that regard. Our service was consulted for further evaluation of mood and alcohol use.    Today patient affirms the above. Notes prior to COVID he has no issues in the domain of mood or alcohol use. Would drink in a normal and social fashion with friends. Never felt depressed prior. Notes pre-COVID he had a good routine. Would wake ealry and work-out, then go to work, then come home and cook (something he enjoyed doing) and hanging out with his roommate.    Since COVID he continues to work (), but is now doing so from home. Works in his bedroom and spends most of his time in his bedroom, so one day bleeds into the next and his normal routine got disrupted. This led to retreating to alcohol out of boredom and with the alcohol use found his mood would further deplete. This began imposing on his quality of sleep and he finds sleep is much more restless and non-restorative. His hobby of cooking fallen by the wayside as well. Finds himself more lazy, less motivated to prepare food so retreats to simpler things such as crock-pot cooking whereas prior he liked experimenting with ingredients and being more adventurous in his cooking. 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 He denies any emergence of more nihilistic thoughts, no SI or other safety concerns.    He does express interest in getting connected with a therapist. Feels if he could get back into his routine he'd be in a much better place mentally. After discussion he's also open to trialing a medication that can assist with sleep and have potential utility for mood as well.       PAST MEDICAL HISTORY:  Past Medical History:   Diagnosis Date     Uncomplicated asthma        FAMILY HISTORY:  History reviewed. No pertinent family history.    SOCIAL HISTORY:  Social History     Socioeconomic History     Marital status: Single     Spouse name: None     Number of children: None     Years of education: None     Highest education level: None   Occupational History     None   Social Needs     Financial resource strain: None     Food insecurity     Worry: None     Inability: None     Transportation needs     Medical: None     Non-medical: None   Tobacco Use     Smoking status: Never Smoker     Smokeless tobacco: Never Used   Substance and Sexual Activity     Alcohol use: Yes     Comment: social     Drug use: None     Sexual activity: Never   Lifestyle     Physical activity     Days per week: None     Minutes per session: None     Stress: None   Relationships     Social connections     Talks on phone: None     Gets together: None     Attends Restoration service: None     Active member of club or organization: None     Attends meetings of clubs or organizations: None     Relationship status: None     Intimate partner violence     Fear of current or ex partner: None     Emotionally abused: None     Physically abused: None     Forced sexual activity: None   Other Topics Concern     None   Social History Narrative     None       REVIEW OF SYSTEMS:  10 point review of systems completed and negative else reported in the HPI.    ALLERGIES:  No Known Allergies    PRIOR TO ADMISSION MEDICATIONS:  None       LABS/VITALS:  Recent  Results (from the past 24 hour(s))   CBC with platelets differential    Collection Time: 01/09/21  2:31 PM   Result Value Ref Range    WBC 12.3 (H) 4.0 - 11.0 10e9/L    RBC Count 4.77 4.4 - 5.9 10e12/L    Hemoglobin 16.1 13.3 - 17.7 g/dL    Hematocrit 46.8 40.0 - 53.0 %    MCV 98 78 - 100 fl    MCH 33.8 (H) 26.5 - 33.0 pg    MCHC 34.4 31.5 - 36.5 g/dL    RDW 11.7 10.0 - 15.0 %    Platelet Count 272 150 - 450 10e9/L    Diff Method Automated Method     % Neutrophils 87.0 %    % Lymphocytes 7.8 %    % Monocytes 4.4 %    % Eosinophils 0.0 %    % Basophils 0.5 %    % Immature Granulocytes 0.3 %    Nucleated RBCs 0 0 /100    Absolute Neutrophil 10.7 (H) 1.6 - 8.3 10e9/L    Absolute Lymphocytes 1.0 0.8 - 5.3 10e9/L    Absolute Monocytes 0.5 0.0 - 1.3 10e9/L    Absolute Eosinophils 0.0 0.0 - 0.7 10e9/L    Absolute Basophils 0.1 0.0 - 0.2 10e9/L    Abs Immature Granulocytes 0.0 0 - 0.4 10e9/L    Absolute Nucleated RBC 0.0    Comprehensive metabolic panel    Collection Time: 01/09/21  2:31 PM   Result Value Ref Range    Sodium 139 133 - 144 mmol/L    Potassium 4.3 3.4 - 5.3 mmol/L    Chloride 102 94 - 109 mmol/L    Carbon Dioxide 31 20 - 32 mmol/L    Anion Gap 6 3 - 14 mmol/L    Glucose 129 (H) 70 - 99 mg/dL    Urea Nitrogen 8 7 - 30 mg/dL    Creatinine 0.82 0.66 - 1.25 mg/dL    GFR Estimate >90 >60 mL/min/[1.73_m2]    GFR Estimate If Black >90 >60 mL/min/[1.73_m2]    Calcium 10.2 (H) 8.5 - 10.1 mg/dL    Bilirubin Total 0.7 0.2 - 1.3 mg/dL    Albumin 4.6 3.4 - 5.0 g/dL    Protein Total 8.0 6.8 - 8.8 g/dL    Alkaline Phosphatase 126 40 - 150 U/L     (H) 0 - 70 U/L     (H) 0 - 45 U/L   Lipase    Collection Time: 01/09/21  2:31 PM   Result Value Ref Range    Lipase 6,918 (H) 73 - 393 U/L   Asymptomatic Influenza A/B & SARS-CoV2 (COVID-19) Virus PCR Multiplex    Collection Time: 01/09/21  5:22 PM    Specimen: Nasopharyngeal   Result Value Ref Range    Flu A/B & SARS-COV-2 PCR Source Nasopharyngeal     SARS-CoV-2 PCR  Result NEGATIVE     Influenza A PCR Negative NEG^Negative    Influenza B PCR Negative NEG^Negative    Respiratory Syncytial Virus PCR (Note)     Flu A/B & SARS-CoV-2 PCR Comment (Note)    UA with Microscopic reflex to Culture    Collection Time: 01/09/21  7:48 PM    Specimen: Midstream Urine   Result Value Ref Range    Color Urine Yellow     Appearance Urine Clear     Glucose Urine Negative NEG^Negative mg/dL    Bilirubin Urine Negative NEG^Negative    Ketones Urine 10 (A) NEG^Negative mg/dL    Specific Gravity Urine 1.014 1.003 - 1.035    Blood Urine Negative NEG^Negative    pH Urine 6.0 5.0 - 7.0 pH    Protein Albumin Urine Negative NEG^Negative mg/dL    Urobilinogen mg/dL 0.0 0.0 - 2.0 mg/dL    Nitrite Urine Negative NEG^Negative    Leukocyte Esterase Urine Negative NEG^Negative    Source Midstream Urine     WBC Urine 0 0 - 5 /HPF    RBC Urine <1 0 - 2 /HPF    Mucous Urine Present (A) NEG^Negative /LPF    Hyaline Casts 1 0 - 2 /LPF   Magnesium    Collection Time: 01/09/21  9:26 PM   Result Value Ref Range    Magnesium 1.6 1.6 - 2.3 mg/dL   Phosphorus    Collection Time: 01/09/21  9:26 PM   Result Value Ref Range    Phosphorus 4.2 2.5 - 4.5 mg/dL     B/P: 151/110, T: 97.7, P: 88, R: 18    MENTAL STATUS EXAM:  Appearance:  awake, alert  Eye Contact:  good  Speech:  clear, coherent  Language: normal  Psychomotor Behavior:  no evidence of tardive dyskinesia, dystonia, or tics  Mood:  depressed  Affect:  appropriate and in normal range  Thought Process:  logical, linear and goal oriented no loose associations  Thought Content:  no evidence of suicidal ideation; no evidence of homicidal ideation; no observation of response to internal stimuli  Oriented to:  time, person, and place  Attention Span and Concentration:  intact  Recent and Remote Memory:  intact  Fund of Knowledge: appropriate  Insight:  good  Judgment:  intact       DIAGNOSES:  1. Alcohol abuse   2. Pancreatitis secondary to #1  3. Major depressive  disorder, moderate    RECOMMENDATIONS:  1. Will begin Remeron 15 mg at bedtime with initial targets of sleep and hopeful downstream benefit for mood  2. Therapy recommended and patient interested in getting connected with this; would ask SW to provide some referral resources in this regard  3. No indication for psychiatric hospitalization      Attestation:  Patient has been seen and evaluated by me,  Mik Jason,     Visit/Communication Style   VIRTUAL (VIDEO) communication was used to evaluate Lloyd due to the COVID pandemic.    Lloyd consented to the use of video communication: yes  Video START time: 11:47 am, 1/10/2021  Video STOP time: 11:58 am, 1/10/2021   Patient's location: [facility]   Provider's location during the visit: Home

## 2021-01-10 NOTE — PROGRESS NOTES
Admission    Patient arrives to room 618-1 via cart from ED.  Care plan note: Pt A&Ox4, ambulating with minimal assist upon arrival. Belongings bag at bedside.    Inpatient nursing criteria listed below were met:    PCD's Documented: Yes  Skin issues/needs documented :Yes  Isolation education started/completed NA  Patient allergies verified with patient: Yes  Verified completion of [place] Risk Assessment Tool:  Yes  Verified completion of Guardianship screening tool: Yes  Fall Prevention: Care plan updated, Education given and documented Yes  Care Plan initiated: Yes  Home medications documented in belongings flowsheet: NA  Patient belongings documented in belongings flowsheet: Yes  Reminder note (belongings/ medications) placed in discharge instructions:No  Admission profile/ required documentation complete: No  Bedside Report Letter given and explained to patient Yes  Visitor Designated? NA  If patient is a 72 hour hold/Commitment are belongings removed from room and locked up? NA

## 2021-01-10 NOTE — PROGRESS NOTES
MD Notification    Notified Person: MD    Notified Person Name: Niraj    Notification Date/Time: 2053 1/9/2021    Notification Interaction: page    Purpose of Notification: Pt received Norco (pain increased) then 0.3 iv dilaudid- no change in pain. still rates 9/10, pt guarding abdomen/sweating. Can we give him more pain meds?    Orders Received: MD came up to see patient, ordered higher dose of IV dilaudid.     Comments:  Increased IVF rate  Added telemetry and CIWA  Ordered Abdominal CT

## 2021-01-10 NOTE — PROGRESS NOTES
[facility]  Medicine Progress Note - Hospitalist Service       Date of Admission:  1/9/2021  Assessment & Plan       Lloyd Dillon is a 25 year old male with minimal past medical history who presented to the ER with abdominal pain, nausea and vomiting.  He has been found to have acute pancreatitis likely secondary to alcohol abuse.    Acute pancreatitis likely in the context of alcohol use which has increased to 4-5 days a week in the past several months during covid.  Reports 3-5 drinks per night when he is drinking and states he has essentially nothing to do.  Denies thoughts of self harm or suicidal ideation but has felt down/depressed at times.  Denies any other drug or substance use.  On admission lipase 6918 and  .  Ultrasound RUQ negative for Colelithiasis/choledocholithiasis or dilated CBD.  Slight leukocytosis of 12.3.  Calcium 10.2. Given morphine and dilaudid in ED as well as IVF with some improvement of symptoms.    Acute pancreatitis  Worsened abdominal pain after admission, CT abdomen pelvis shows findings consistent with acute pancreatitis but no complication of pseudocyst or no other bowel pathology.  Lipase has worsened.  - Continue IV fluid with NS at 200/hr, urine output has improved. Monitor I&O - ensure adequate UOP   - Continue n.p.o. until pain better controlled and lipase starts trending down.  If pain persists or lipase continue to worsen, will need [place] feeding.  -Pain controlled with IV hydromorphone. He is needing 1 mg every 3 hours.  If this is inadequate, will consider CTA.  - PRN antiemetics.   - Discussed and encouraged alcohol cessation. [place] protocol, monitor for alcohol withdrawal.   -Lipase went to 9k from 6K, lactate slightly better.  Daily labs.  Check triglyceride level.    Depression  -Psychiatry consulted my started on Remeron  -Appreciate assistance    Incidental finding of urinary bladder wall thickening  Patient reports lower  abdominal/pelvic pain which is separate than his epigastric pain going to back from pancreatitis.  UA is negative.  He had decreased urine output and with IV hydration urine output has improved and so urinating more frequently but denies any dysuria, hematuria.  -Urology consulted.    COVID-19 asymptomatic screening  - PCR pending from ED, no indication for special precautions     Diet: NPO for Medical/Clinical Reasons Except for: Meds, Ice Chips    DVT Prophylaxis: Pneumatic Compression Devices, encourage ambulation  Griffith Catheter: not present  Code Status: Full Code           Disposition Plan   Expected discharge: 2+ days, recommended to prior living arrangement once Pancreatitis/pain improves, diet introduced and advanced and tolerated.  Entered: Shoshana Duong MD 01/10/2021, 3:19 PM       The patient's care was discussed with the Bedside Nurse and Patient.    Shoshana Duong MD  Hospitalist Service  [facility]  Contact information available via [facility] Paging/Directory    ______________________________________________________________________    Interval History   Continues to have severe pain in his abdomen radiating to back.  He states the IV pain medication lasts only about 2 hours.  Denies nausea.  Urine output has improved.  Because of pain with movement, he feels like he cannot take deep breaths.  No diarrhea, hematochezia or melena.    Data reviewed today: I reviewed all medications, new labs and imaging results over the last 24 hours. I personally reviewed no images or EKG's today.  RUQ ultrasound and CT abdomen pelvis on admission noted, no gallstone or dilated CBD.  Is finding of acute pancreatitis with pancreatic ascites and inflammatory changes in the anterior abdominal mesentery.  Fatty infiltration of liver also noted was circumferential bladder wall thickening possible UTI.  No hydro-nephrosis.    Physical Exam   Vital Signs: Temp: 97.7  F (36.5  C) Temp src: Oral BP:  (!) 151/110 Pulse: 88   Resp: 18 SpO2: 99 % O2 Device: None (Room air)    Weight: 173 lbs 9.6 oz    General: AAOx3, very pleasant, appears uncomfortable due to pain  HEENT: PERRLA EOMI. Mucosa dry  Lungs: Bilateral equal air entry. Clear to auscultation, normal work of breathing.   CVS: S1S2 regular, no tachycardia or murmur.   Abdomen: Soft, generalized tenderness. BS heard.  MSK: No edema or deformities.  Neuro: AAOX3. CN 2-12 normal. Strength symmetrical.  Skin: No rash.       Data   Recent Labs   Lab 01/10/21  1140 01/09/21  1431   WBC 10.7 12.3*   HGB 14.9 16.1    98    272    139   POTASSIUM 4.0 4.3   CHLORIDE 104 102   CO2 29 31   BUN 5* 8   CR 0.70 0.82   ANIONGAP 5 6   QUANG 8.9 10.2*   GLC 99 129*   ALBUMIN 3.7 4.6   PROTTOTAL 6.5* 8.0   BILITOTAL 0.9 0.7   ALKPHOS 83 126   ALT 92* 145*   AST 57* 126*   LIPASE 9,955* 6,918*     Medications     sodium chloride 200 mL/hr at 01/10/21 1107       folic acid  1 mg Oral Daily     mirtazapine  15 mg Oral At Bedtime     multivitamin w/minerals  1 tablet Oral Daily     sodium chloride (PF)  3 mL Intracatheter Q8H     thiamine  200 mg Oral TID    Followed by     [START ON 1/11/2021] thiamine  100 mg Oral TID    Followed by     [START ON 1/17/2021] thiamine  100 mg Oral Daily

## 2021-01-10 NOTE — PLAN OF CARE
DATE & TIME: 1545-2330  Cognitive Concerns/ Orientation : A&Ox4   BEHAVIOR & AGGRESSION TOOL COLOR: green  CIWA SCORE: 2   ABNL VS/O2: VSS on RA  MOBILITY: SBA  PAIN MANAGMENT: Norco given (ineffective), given IV dilaudid (minorly effective), MD increased dose.  DIET: NPO  BOWEL/BLADDER: continent, no BM this shift. Urine sample sent  ABNL LAB/BG: , , Lipase 6,918, WBC 12.3. Mag 1.6 - ok. On standard protocol, no replacement needed.  DRAIN/DEVICES: PIV infusing NS at 200mL/hr  TELEMETRY RHYTHM: NSR  SKIN: WDL  TESTS/PROCEDURES: Abdominal CT done results pending  D/C DAY/GOALS/PLACE: pending improvement  OTHER IMPORTANT INFO: Psych consulted.

## 2021-01-11 LAB
ALBUMIN SERPL-MCNC: 3.4 G/DL (ref 3.4–5)
ALP SERPL-CCNC: 70 U/L (ref 40–150)
ALT SERPL W P-5'-P-CCNC: 67 U/L (ref 0–70)
ANION GAP SERPL CALCULATED.3IONS-SCNC: 5 MMOL/L (ref 3–14)
AST SERPL W P-5'-P-CCNC: 38 U/L (ref 0–45)
BASOPHILS # BLD AUTO: 0 10E9/L (ref 0–0.2)
BASOPHILS NFR BLD AUTO: 0.2 %
BILIRUB SERPL-MCNC: 1.1 MG/DL (ref 0.2–1.3)
BUN SERPL-MCNC: 5 MG/DL (ref 7–30)
CALCIUM SERPL-MCNC: 8.7 MG/DL (ref 8.5–10.1)
CHLORIDE SERPL-SCNC: 102 MMOL/L (ref 94–109)
CO2 SERPL-SCNC: 29 MMOL/L (ref 20–32)
CREAT SERPL-MCNC: 0.68 MG/DL (ref 0.66–1.25)
DIFFERENTIAL METHOD BLD: ABNORMAL
EOSINOPHIL # BLD AUTO: 0 10E9/L (ref 0–0.7)
EOSINOPHIL NFR BLD AUTO: 0 %
ERYTHROCYTE [DISTWIDTH] IN BLOOD BY AUTOMATED COUNT: 11.9 % (ref 10–15)
GFR SERPL CREATININE-BSD FRML MDRD: >90 ML/MIN/{1.73_M2}
GLUCOSE SERPL-MCNC: 93 MG/DL (ref 70–99)
HCT VFR BLD AUTO: 40.6 % (ref 40–53)
HGB BLD-MCNC: 14 G/DL (ref 13.3–17.7)
IMM GRANULOCYTES # BLD: 0.1 10E9/L (ref 0–0.4)
IMM GRANULOCYTES NFR BLD: 0.5 %
LIPASE SERPL-CCNC: 3367 U/L (ref 73–393)
LYMPHOCYTES # BLD AUTO: 0.6 10E9/L (ref 0.8–5.3)
LYMPHOCYTES NFR BLD AUTO: 5.7 %
MCH RBC QN AUTO: 33.8 PG (ref 26.5–33)
MCHC RBC AUTO-ENTMCNC: 34.5 G/DL (ref 31.5–36.5)
MCV RBC AUTO: 98 FL (ref 78–100)
MONOCYTES # BLD AUTO: 0.8 10E9/L (ref 0–1.3)
MONOCYTES NFR BLD AUTO: 7.5 %
NEUTROPHILS # BLD AUTO: 8.6 10E9/L (ref 1.6–8.3)
NEUTROPHILS NFR BLD AUTO: 86.1 %
NRBC # BLD AUTO: 0 10*3/UL
NRBC BLD AUTO-RTO: 0 /100
PLATELET # BLD AUTO: 167 10E9/L (ref 150–450)
POTASSIUM SERPL-SCNC: 3.7 MMOL/L (ref 3.4–5.3)
PROT SERPL-MCNC: 6.3 G/DL (ref 6.8–8.8)
RBC # BLD AUTO: 4.14 10E12/L (ref 4.4–5.9)
SODIUM SERPL-SCNC: 136 MMOL/L (ref 133–144)
WBC # BLD AUTO: 10 10E9/L (ref 4–11)

## 2021-01-11 PROCEDURE — 250N000013 HC RX MED GY IP 250 OP 250 PS 637: Performed by: PSYCHIATRY & NEUROLOGY

## 2021-01-11 PROCEDURE — 120N000001 HC R&B MED SURG/OB

## 2021-01-11 PROCEDURE — 258N000003 HC RX IP 258 OP 636: Performed by: HOSPITALIST

## 2021-01-11 PROCEDURE — 250N000013 HC RX MED GY IP 250 OP 250 PS 637: Performed by: HOSPITALIST

## 2021-01-11 PROCEDURE — 99221 1ST HOSP IP/OBS SF/LOW 40: CPT | Performed by: UROLOGY

## 2021-01-11 PROCEDURE — 99232 SBSQ HOSP IP/OBS MODERATE 35: CPT | Performed by: HOSPITALIST

## 2021-01-11 PROCEDURE — 83690 ASSAY OF LIPASE: CPT | Performed by: HOSPITALIST

## 2021-01-11 PROCEDURE — 85025 COMPLETE CBC W/AUTO DIFF WBC: CPT | Performed by: HOSPITALIST

## 2021-01-11 PROCEDURE — 36415 COLL VENOUS BLD VENIPUNCTURE: CPT | Performed by: HOSPITALIST

## 2021-01-11 PROCEDURE — C9113 INJ PANTOPRAZOLE SODIUM, VIA: HCPCS | Performed by: HOSPITALIST

## 2021-01-11 PROCEDURE — 250N000011 HC RX IP 250 OP 636: Performed by: HOSPITALIST

## 2021-01-11 PROCEDURE — 80053 COMPREHEN METABOLIC PANEL: CPT | Performed by: HOSPITALIST

## 2021-01-11 RX ORDER — ACETAMINOPHEN 325 MG/1
650 TABLET ORAL EVERY 6 HOURS PRN
Status: DISCONTINUED | OUTPATIENT
Start: 2021-01-11 | End: 2021-01-12 | Stop reason: HOSPADM

## 2021-01-11 RX ADMIN — SODIUM CHLORIDE: 9 INJECTION, SOLUTION INTRAVENOUS at 19:09

## 2021-01-11 RX ADMIN — MULTIPLE VITAMINS W/ MINERALS TAB 1 TABLET: TAB at 08:06

## 2021-01-11 RX ADMIN — ACETAMINOPHEN 650 MG: 325 TABLET, FILM COATED ORAL at 01:22

## 2021-01-11 RX ADMIN — THIAMINE HCL TAB 100 MG 100 MG: 100 TAB at 21:14

## 2021-01-11 RX ADMIN — FOLIC ACID 1 MG: 1 TABLET ORAL at 08:06

## 2021-01-11 RX ADMIN — PANTOPRAZOLE SODIUM 40 MG: 40 INJECTION, POWDER, FOR SOLUTION INTRAVENOUS at 08:06

## 2021-01-11 RX ADMIN — THIAMINE HCL TAB 100 MG 200 MG: 100 TAB at 08:06

## 2021-01-11 RX ADMIN — MIRTAZAPINE 15 MG: 15 TABLET, FILM COATED ORAL at 21:13

## 2021-01-11 RX ADMIN — THIAMINE HCL TAB 100 MG 200 MG: 100 TAB at 16:06

## 2021-01-11 RX ADMIN — SODIUM CHLORIDE: 9 INJECTION, SOLUTION INTRAVENOUS at 11:36

## 2021-01-11 RX ADMIN — SODIUM CHLORIDE: 9 INJECTION, SOLUTION INTRAVENOUS at 04:33

## 2021-01-11 ASSESSMENT — ACTIVITIES OF DAILY LIVING (ADL)
ADLS_ACUITY_SCORE: 17
ADLS_ACUITY_SCORE: 17
ADLS_ACUITY_SCORE: 16
ADLS_ACUITY_SCORE: 17

## 2021-01-11 ASSESSMENT — MIFFLIN-ST. JEOR: SCORE: 1771.9

## 2021-01-11 NOTE — PROVIDER NOTIFICATION
MD Notification    Notified Person: MD    Notified Person Name: Rhoda    Notification Date/Time: 1/11 2:51 pm    Notification Interaction: Web based page    Purpose of Notification: Pt wanting to try to eat. Abdominal pain rated a 2, has not had anything for pain. Diet order?    Orders Received: Sips of water. Can go to clears if tolerates.     Comments:

## 2021-01-11 NOTE — PLAN OF CARE
DATE & TIME: 1/10/21 7057-3988  Cognitive Concerns/ Orientation : A&O x4   BEHAVIOR & AGGRESSION TOOL COLOR: Green  CIWA SCORE: 3/3. Anxious and slight tremors.  ABNL VS/O2: BP elevated. Other VSS on room air  MOBILITY: Ind  PAIN MANAGMENT: C/o abdominal and back pain as needed Dilaudid given x1. Order had been discontinued, paged provider and got orders renewed for this.  DIET: NPO ex ice chips and meds  BOWEL/BLADDER: Continent. No BM this shift  ABNL LAB/BG: LFTs improved, Lipase 9,995  DRAIN/DEVICES: PIV infusing at 150 ml/hr  TELEMETRY RHYTHM: NSR  TESTS/PROCEDURES: N/a  D/C DAY/GOALS/PLACE: Expected discharge in 2-3 days pending improvement of symptoms and toleration of diet per MD notes  OTHER IMPORTANT INFO: Psych consulted, started on Remeron. C/o groin pain, urology consulted.

## 2021-01-11 NOTE — PROGRESS NOTES
[facility]  Medicine Progress Note - Hospitalist Service       Date of Admission:  1/9/2021  Assessment & Plan       Lloyd Dillon is a 25 year old male with minimal past medical history who presented to the ER with abdominal pain, nausea and vomiting.  He has been found to have acute pancreatitis likely secondary to alcohol abuse.    Acute pancreatitis likely in the context of alcohol use which has increased to 4-5 days a week in the past several months during covid.  Reports 3-5 drinks per night when he is drinking and states he has essentially nothing to do.  Denies thoughts of self harm or suicidal ideation but has felt down/depressed at times.  Denies any other drug or substance use.  On admission lipase 6918 and  .  Ultrasound RUQ negative for Colelithiasis/choledocholithiasis or dilated CBD.  Slight leukocytosis of 12.3.  Calcium 10.2. Given morphine and dilaudid in ED as well as IVF with some improvement of symptoms.    Acute pancreatitis  Worsened abdominal pain after admission, CT abdomen pelvis shows findings consistent with acute pancreatitis but no complication of pseudocyst or no other bowel pathology.  Lipase has worsened.  - Continue IV fluid, NS at 150/hr, urine output has improved, possibly decrease this evening.. Monitor I&O - ensure adequate UOP   - Remains NPO, pending a.m. labs, if pain continues to improve and lipase starts improving, I will start sips/clear liquid this afternoon.     - Pain control- IV hydromorphone, p.o. Norco available. PRN antiemetics.   - Discussed and encouraged alcohol cessation. [facility] protocol, monitor for alcohol withdrawal-no signs of withdrawal so far.   - Lipase initially worsened to 9k from 6K, lactate better.  Triglyceride normal.  -Pending a.m. labs, follow    Depression  -Psychiatry consulted my started on Remeron  -Appreciate assistance    Incidental finding of urinary bladder wall thickening  Patient reports lower  abdominal/pelvic pain which is separate than his epigastric pain going to back from pancreatitis.  UA is negative.  He had decreased urine output and with IV hydration urine output has improved and so urinating more frequently but denies any dysuria, hematuria.  -Urology consulted.  Urine culture was sent, no further work-up as inpatient, outpatient follow-up as needed recommended.  Appreciate assistance.    COVID-19 asymptomatic screening  - PCR pending from ED, no indication for special precautions     Diet: NPO for Medical/Clinical Reasons Except for: Meds, Ice Chips    DVT Prophylaxis: Pneumatic Compression Devices, encourage ambulation  Griffith Catheter: not present  Code Status: Full Code           Disposition Plan   Expected discharge: Likely 2 days, recommended to prior living arrangement once Pancreatitis/pain improves, diet introduced and advanced and tolerated.  Entered: Shoshana Duong MD 01/11/2021, 9:36 AM       The patient's care was discussed with the Bedside Nurse and Patient.  I offered to update his family but he stated he is talking to them and not needed.    Shoshana Duong MD  Hospitalist Service    Contact information available via [facility] Paging/Directory    ______________________________________________________________________    Interval History   Epigastric abdominal/back pain has much improved and is using much less pain medication.  Did not use  Dilaudid since 7 pm, took Tylenol overnight.  Appears much more comfortable today.  Denies dyspnea, chest pain, nausea.  Making adequate urine. No BM. Remains afebrile.    Data reviewed today: I reviewed all medications, new labs and imaging results over the last 24 hours. I personally reviewed no images or EKG's today.       Physical Exam   Vital Signs: Temp: 98.9  F (37.2  C) Temp src: Oral BP: (!) 144/98 Pulse: 88   Resp: 18 SpO2: 98 % O2 Device: None (Room air)    Weight: 175 lbs 9.6 oz    General: AAOx3,  very pleasant, appears comfortable today  HEENT: KIKO EOMI. Mucosa dry  Lungs: Bilateral equal air entry. Clear to auscultation, normal work of breathing.   CVS: S1S2 regular, no tachycardia or murmur.   Abdomen: Soft, generalized tenderness but much better today. BS heard.  MSK: No edema or deformities.  Neuro: AAOX3. CN 2-12 normal. Strength symmetrical.  Skin: No rash.       Data   Recent Labs   Lab 01/10/21  1140 01/09/21  1431   WBC 10.7 12.3*   HGB 14.9 16.1    98    272    139   POTASSIUM 4.0 4.3   CHLORIDE 104 102   CO2 29 31   BUN 5* 8   CR 0.70 0.82   ANIONGAP 5 6   QUANG 8.9 10.2*   GLC 99 129*   ALBUMIN 3.7 4.6   PROTTOTAL 6.5* 8.0   BILITOTAL 0.9 0.7   ALKPHOS 83 126   ALT 92* 145*   AST 57* 126*   LIPASE 9,955* 6,918*     Medications     sodium chloride 150 mL/hr at 01/11/21 0433       folic acid  1 mg Oral Daily     mirtazapine  15 mg Oral At Bedtime     multivitamin w/minerals  1 tablet Oral Daily     pantoprazole (PROTONIX) IV  40 mg Intravenous Daily with breakfast     sodium chloride (PF)  3 mL Intracatheter Q8H     thiamine  200 mg Oral TID    Followed by     thiamine  100 mg Oral TID    Followed by     [START ON 1/17/2021] thiamine  100 mg Oral Daily

## 2021-01-11 NOTE — PLAN OF CARE
DATE & TIME: 1/11/21 7586-2706            Cognitive Concerns/ Orientation : A&O x4   BEHAVIOR & AGGRESSION TOOL COLOR: Green  CIWA SCORE: 0 and 0      ABNL VS/O2: BP elevated. Tachycardic at times. Other VSS on room air  MOBILITY: Independent, walks in halls.   PAIN MANAGMENT: Reports some back and abd pain, declines any pain meds or cold/hot packs.   DIET: Sips of water, can go to clear liquid if tolerates.   BOWEL/BLADDER: Continent, uses urinal in BR.   ABNL LAB/BG: LFTs and lipase improved  DRAIN/DEVICES: PIV infusing NS at 150 ml/hr  TELEMETRY RHYTHM: NSR  SKIN: WDL  TESTS/PROCEDURES: N/a  D/C DAY/GOALS/PLACE: 1-2 days pending pain and diet.    OTHER IMPORTANT INFO: Psychiatry consulted, started on Remeron. Urology saw Pt this AM and no interventions.

## 2021-01-11 NOTE — PLAN OF CARE
DATE & TIME: 1/10/21, 7881 - 4056   Cognitive Concerns/ Orientation : A&O x 4   BEHAVIOR & AGGRESSION TOOL COLOR: Green  CIWA SCORE: 2   ABNL VS/O2: /96. Tachycardic at times. Other VSS on room air  MOBILITY: Independent  PAIN MANAGMENT: Prn Tylenol given for back pain and mild headache. Helpful. Application of cold packs to back also helpful  DIET: NPO ex ice chips and meds  BOWEL/BLADDER: Continent of bladder and bowels  ABNL LAB/BG: AM labs pending  DRAIN/DEVICES: PIV infusing at 150 ml/hr  TELEMETRY RHYTHM: NSR  SKIN: WDL  TESTS/PROCEDURES: N/a  D/C DAY/GOALS/PLACE: Expected discharge in 2+ days once pancreatitis /pain improves, diet introduced, advanced and tolerated  OTHER IMPORTANT INFO: Psychiatry following. Urology consulted

## 2021-01-11 NOTE — CONSULTS
Urology Consult    Name: Lloyd Dillon    MRN: [id number]   YOB: 1995               Chief Complaint:   Bladder wall thickening    History is obtained from the patient and chart review          History of Present Illness:   Lloyd Dillon is a 25 year old male with no significant PMH and no previous urologic history who presented to the ER with c/o abdominal pain, suprapubic pain, and n/v with w/u revealing acute pancreatitis, most likely due to alcohol abuse. W/u also included UA (unremarkable, with no RBC/WBC) and a CT A/P on 1/9 which noted circumferential bladder wall thickening with no tumors/diverticula. Urology was consulted for further assessment.     Patient reports resolution of suprapubic pain today and denies any prior hx or current gross hematuria, dysuria, frequency, urgency, or nocturia. Denies any prior urologic issues.            Past Medical History:     Noted for alcohol abuse, asthma; remainder reviewed in EMR         Past Surgical History:     No pertinent PSHx; remainder reviewed in EMR  History reviewed. No pertinent surgical history.         Social History:     Alcohol abuse  Never smoker, no second hand exposure         Family History:     No pertinent fhx; remainder reviewed in EMR  History reviewed. No pertinent family history.         Allergies:     No Known Allergies         Medications:     No pertinent meds; remainder reviewed in EMR          Review of Systems:      ROS: 10 point ROS neg other than the symptoms noted above in the HPI           Physical Exam:     VS:  T: 100.3    HR: 86    BP: 147/97    RR: 18   GEN:  AOx3.  NAD.    LUNGS: Non-labored breathing.   BACK:  No midline or CVA tenderness.  ABD:  Soft.  NT.  ND.  No rebound or guarding.  No masses.  EXT:  Warm, well perfused.  No edema.  SKIN:  Warm.  Dry.  No rashes.  NEURO:  A&O. CN grossly intact.            Data:   All laboratory data reviewed:.    CBC noted for normalized WBC today  BMP  unremarkable    UA 1/9 unremarkable, no ucx collected at the time    Heme:  Recent Labs   Lab 01/11/21  1102 01/10/21  1140 01/09/21  1431   WBC 10.0 10.7 12.3*   HGB 14.0 14.9 16.1    187 272     Chem:  Recent Labs   Lab 01/11/21  1102 01/10/21  1140 01/09/21  1431   POTASSIUM 3.7 4.0 4.3   CR 0.68 0.70 0.82       All pertinent imaging reviewed, noted for above..         Impression and Plan:     Impression:   Lloyd Dillon is a 25 year old male with no prior urologic hx who is admitted for acute pancreatitis 2/2 alcohol abuse. Pt was c/o suprapubic pain on admission and CT at the time noted circumferential bladder wall thickening with no other abnormal findings. UA from admission is unremarkable and pt denies any urologic symptoms during this admission with no CVAT or suprapubic tenderness today    Etiology of bladder wall thickening is unclear but could be 2/2 irritation from his pancreatitis.  No further interventions needed at this time but pt may follow-up with urology PRN if he develops any new concerning symptoms      Plan:     - No acute interventions at this time    - Obtain ucx to confirm absence of UTI, although this is highly unlikely given unremarkable UA    - Pt may follow-up with urology as needed as outpatient if he develops any new urinary symptoms     - Urology will sign off. Please contact resident/PA on call with any questions or concerns.         This patient's exam findings, labs, and imaging discussed with urology staff surgeon, Dr. Mcintosh, who developed the treatment plan.    Merly Ryan MD MS  Urology Resident

## 2021-01-12 VITALS
WEIGHT: 174 LBS | OXYGEN SATURATION: 100 % | BODY MASS INDEX: 25.77 KG/M2 | SYSTOLIC BLOOD PRESSURE: 157 MMHG | HEIGHT: 69 IN | HEART RATE: 74 BPM | RESPIRATION RATE: 18 BRPM | DIASTOLIC BLOOD PRESSURE: 96 MMHG | TEMPERATURE: 99.9 F

## 2021-01-12 LAB
ALBUMIN SERPL-MCNC: 3.3 G/DL (ref 3.4–5)
ALP SERPL-CCNC: 71 U/L (ref 40–150)
ALT SERPL W P-5'-P-CCNC: 58 U/L (ref 0–70)
ANION GAP SERPL CALCULATED.3IONS-SCNC: 4 MMOL/L (ref 3–14)
AST SERPL W P-5'-P-CCNC: 28 U/L (ref 0–45)
BASOPHILS # BLD AUTO: 0 10E9/L (ref 0–0.2)
BASOPHILS NFR BLD AUTO: 0.3 %
BILIRUB SERPL-MCNC: 1.3 MG/DL (ref 0.2–1.3)
BUN SERPL-MCNC: 5 MG/DL (ref 7–30)
CALCIUM SERPL-MCNC: 8.7 MG/DL (ref 8.5–10.1)
CHLORIDE SERPL-SCNC: 104 MMOL/L (ref 94–109)
CO2 SERPL-SCNC: 30 MMOL/L (ref 20–32)
CREAT SERPL-MCNC: 0.64 MG/DL (ref 0.66–1.25)
DIFFERENTIAL METHOD BLD: ABNORMAL
EOSINOPHIL # BLD AUTO: 0 10E9/L (ref 0–0.7)
EOSINOPHIL NFR BLD AUTO: 0.1 %
ERYTHROCYTE [DISTWIDTH] IN BLOOD BY AUTOMATED COUNT: 11.7 % (ref 10–15)
GFR SERPL CREATININE-BSD FRML MDRD: >90 ML/MIN/{1.73_M2}
GLUCOSE SERPL-MCNC: 107 MG/DL (ref 70–99)
HCT VFR BLD AUTO: 38.8 % (ref 40–53)
HGB BLD-MCNC: 13.4 G/DL (ref 13.3–17.7)
IMM GRANULOCYTES # BLD: 0 10E9/L (ref 0–0.4)
IMM GRANULOCYTES NFR BLD: 0.4 %
LIPASE SERPL-CCNC: 1091 U/L (ref 73–393)
LYMPHOCYTES # BLD AUTO: 0.7 10E9/L (ref 0.8–5.3)
LYMPHOCYTES NFR BLD AUTO: 7.7 %
MCH RBC QN AUTO: 33.8 PG (ref 26.5–33)
MCHC RBC AUTO-ENTMCNC: 34.5 G/DL (ref 31.5–36.5)
MCV RBC AUTO: 98 FL (ref 78–100)
MONOCYTES # BLD AUTO: 0.7 10E9/L (ref 0–1.3)
MONOCYTES NFR BLD AUTO: 7.9 %
NEUTROPHILS # BLD AUTO: 7.5 10E9/L (ref 1.6–8.3)
NEUTROPHILS NFR BLD AUTO: 83.6 %
NRBC # BLD AUTO: 0 10*3/UL
NRBC BLD AUTO-RTO: 0 /100
PLATELET # BLD AUTO: 175 10E9/L (ref 150–450)
POTASSIUM SERPL-SCNC: 3.4 MMOL/L (ref 3.4–5.3)
PROT SERPL-MCNC: 6.4 G/DL (ref 6.8–8.8)
RBC # BLD AUTO: 3.97 10E12/L (ref 4.4–5.9)
SODIUM SERPL-SCNC: 138 MMOL/L (ref 133–144)
WBC # BLD AUTO: 9 10E9/L (ref 4–11)

## 2021-01-12 PROCEDURE — C9113 INJ PANTOPRAZOLE SODIUM, VIA: HCPCS | Performed by: HOSPITALIST

## 2021-01-12 PROCEDURE — 258N000003 HC RX IP 258 OP 636: Performed by: HOSPITALIST

## 2021-01-12 PROCEDURE — 85025 COMPLETE CBC W/AUTO DIFF WBC: CPT | Performed by: HOSPITALIST

## 2021-01-12 PROCEDURE — 99239 HOSP IP/OBS DSCHRG MGMT >30: CPT | Performed by: HOSPITALIST

## 2021-01-12 PROCEDURE — 83690 ASSAY OF LIPASE: CPT | Performed by: HOSPITALIST

## 2021-01-12 PROCEDURE — 250N000011 HC RX IP 250 OP 636: Performed by: HOSPITALIST

## 2021-01-12 PROCEDURE — 250N000013 HC RX MED GY IP 250 OP 250 PS 637: Performed by: HOSPITALIST

## 2021-01-12 PROCEDURE — 80053 COMPREHEN METABOLIC PANEL: CPT | Performed by: HOSPITALIST

## 2021-01-12 PROCEDURE — 36415 COLL VENOUS BLD VENIPUNCTURE: CPT | Performed by: HOSPITALIST

## 2021-01-12 RX ORDER — FOLIC ACID 1 MG/1
1 TABLET ORAL DAILY
COMMUNITY
Start: 2021-01-13

## 2021-01-12 RX ORDER — OXYCODONE HYDROCHLORIDE 5 MG/1
5 TABLET ORAL EVERY 8 HOURS PRN
Qty: 10 TABLET | Refills: 0 | Status: SHIPPED | OUTPATIENT
Start: 2021-01-12 | End: 2021-01-12

## 2021-01-12 RX ORDER — OMEPRAZOLE 20 MG/1
20 TABLET, DELAYED RELEASE ORAL DAILY
Qty: 7 TABLET | Refills: 0 | COMMUNITY
Start: 2021-01-12 | End: 2021-09-29

## 2021-01-12 RX ORDER — MIRTAZAPINE 15 MG/1
15 TABLET, FILM COATED ORAL AT BEDTIME
Qty: 30 TABLET | Refills: 0 | Status: SHIPPED | OUTPATIENT
Start: 2021-01-12 | End: 2021-09-29

## 2021-01-12 RX ORDER — LANOLIN ALCOHOL/MO/W.PET/CERES
100 CREAM (GRAM) TOPICAL DAILY
COMMUNITY
Start: 2021-01-17

## 2021-01-12 RX ADMIN — MULTIPLE VITAMINS W/ MINERALS TAB 1 TABLET: TAB at 08:26

## 2021-01-12 RX ADMIN — SODIUM CHLORIDE: 9 INJECTION, SOLUTION INTRAVENOUS at 01:55

## 2021-01-12 RX ADMIN — THIAMINE HCL TAB 100 MG 100 MG: 100 TAB at 17:59

## 2021-01-12 RX ADMIN — ACETAMINOPHEN 650 MG: 325 TABLET, FILM COATED ORAL at 00:02

## 2021-01-12 RX ADMIN — FOLIC ACID 1 MG: 1 TABLET ORAL at 08:27

## 2021-01-12 RX ADMIN — PANTOPRAZOLE SODIUM 40 MG: 40 INJECTION, POWDER, FOR SOLUTION INTRAVENOUS at 08:26

## 2021-01-12 RX ADMIN — SODIUM CHLORIDE: 9 INJECTION, SOLUTION INTRAVENOUS at 09:02

## 2021-01-12 RX ADMIN — THIAMINE HCL TAB 100 MG 100 MG: 100 TAB at 08:27

## 2021-01-12 ASSESSMENT — MIFFLIN-ST. JEOR: SCORE: 1759.64

## 2021-01-12 ASSESSMENT — ACTIVITIES OF DAILY LIVING (ADL)
ADLS_ACUITY_SCORE: 17

## 2021-01-12 NOTE — PROVIDER NOTIFICATION
MD Notification    Notified Person: MD    Notified Person Name: Rhoda    Notification Date/Time: 1/12 11:53 am    Notification Interaction: Web based page    Purpose of Notification: Did you say this morning Pt could try a soft diet starting today? He is tolerating full liquid. Lipase 1,091, LFT wdl.    Orders Received: low fat diet ordered    Comments:

## 2021-01-12 NOTE — PLAN OF CARE
DATE & TIME: 1/12/21, 6142-2862  Cognitive Concerns/ Orientation : A&O x4   BEHAVIOR & AGGRESSION TOOL COLOR: Green  CIWA SCORE: 0 and 0   ABNL VS/O2: /106, other VSS on room air  MOBILITY: Independent  PAIN MANAGMENT: Denies  DIET: Low fat diet, tolerating.   BOWEL/BLADDER: Continent  ABNL LAB/BG: Lipase improved 1,091  DRAIN/DEVICES: PIV infusing NS @ 100  TELEMETRY RHYTHM: NSR  SKIN: WDL  TESTS/PROCEDURES: N/a  D/C DAY/GOALS/PLACE: Pending pancreatitis/pain improves, tolerating diet.   OTHER IMPORTANT INFO: Urology signed off. Psychiatry consulted.

## 2021-01-12 NOTE — PROVIDER NOTIFICATION
MD Notification    Notified Person: MD    Notified Person Name: Rhoda    Notification Date/Time: 1/12 7:48 am    Notification Interaction: Web based page    Purpose of Notification: Pt tolerating clears. Can he try full liquid for breakfast?     Orders Received: Full liquid diet ordered  Comments:

## 2021-01-12 NOTE — PLAN OF CARE
DATE & TIME: 1/11/21 6274-2196            Cognitive Concerns/ Orientation : A&O x4   BEHAVIOR & AGGRESSION TOOL COLOR: Green  CIWA SCORE: 0 and 0      ABNL VS/O2: BP elevated. Tachycardic at times. Temp elevated at 100.3 at around 1600. Rechecked at 2000 and 99.9. Patient appears fine and denies any symptoms. Other VSS on room air.  MOBILITY: Independent, walks in halls.   PAIN MANAGMENT: Reports some back and abd pain, used cold packs.   DIET: clear liquid. Had apple juice this evening and tolerated well.   BOWEL/BLADDER: Continent, uses urinal in BR.   ABNL LAB/BG: LFTs and lipase improved  DRAIN/DEVICES: PIV infusing NS at 150 ml/hr  TELEMETRY RHYTHM: NSR  SKIN: WDL  TESTS/PROCEDURES: N/a  D/C DAY/GOALS/PLACE: 1-2 days pending pain and diet.    OTHER IMPORTANT INFO: Psychiatry consulted, started on Remeron. Urology saw Pt this AM and no interventions.

## 2021-01-12 NOTE — PLAN OF CARE
DATE & TIME: 1/11/21, 4063 - 0070    Cognitive Concerns/ Orientation : A&O x 4   BEHAVIOR & AGGRESSION TOOL COLOR: Green  CIWA SCORE: 0   ABNL VS/O2: T 100.6. Recheck this AM 98.7. /99. Other VSS on room air  MOBILITY: Independent  PAIN MANAGMENT: Prn Tylenol given for fever and mild back pain. Helpful  DIET: Clears  BOWEL/BLADDER: Continent of bladder and bowels  ABNL LAB/BG: AM labs pending  DRAIN/DEVICES: PIV infusing  TELEMETRY RHYTHM: NSR  SKIN: WDL  TESTS/PROCEDURES: N/a  D/C DAY/GOALS/PLACE:  Expected discharge, likely 2 days once pancreatitis /pain improves, diet introduced, advanced and tolerated  OTHER IMPORTANT INFO: Urology signed off. Psychiatry consulted

## 2021-01-12 NOTE — DISCHARGE SUMMARY
[facility]  Hospitalist Discharge Summary      Date of Admission:  1/9/2021  Date of Discharge:  1/12/2021  Discharging Provider: Shoshana Duong MD      Discharge Diagnoses     Acute pancreatitis    Major depression    Incidental finding of urinary bladder wall thickening    Follow-ups Needed After Discharge   Follow-up Appointments     Follow-up and recommended labs and tests       Follow up with primary care provider, as needed.               Unresulted Labs Ordered in the Past 30 Days of this Admission     No orders found from 12/10/2020 to 1/10/2021.      These results will be followed up by none    Discharge Disposition   Discharged to home  Condition at discharge: Stable     Hospital Course         Lloyd Dillon is a 25 year old male with minimal past medical history who presented to the ER with abdominal pain, nausea and vomiting.  He has been found to have acute pancreatitis likely secondary to alcohol abuse.    Acute pancreatitis likely in the context of alcohol use which has increased to 4-5 days a week in the past several months during covid.  Reports 3-5 drinks per night when he is drinking and states he has essentially nothing to do.  Denies thoughts of self harm or suicidal ideation but has felt down/depressed at times.  Denies any other drug or substance use.  On admission lipase 6918 and  .  Ultrasound RUQ negative for Colelithiasis/choledocholithiasis or dilated CBD.  Slight leukocytosis of 12.3.  Calcium 10.2. Given morphine and dilaudid in ED as well as IVF with some improvement of symptoms.    Acute pancreatitis  Worsened abdominal pain after admission, CT abdomen pelvis shows findings consistent with acute pancreatitis but no complication of pseudocyst or no other bowel pathology.  He was admitted with NPO, IVF and pain medications.  Initially his lipase worsened.  Patient was managed with aggressive IV hydration.  Even though he needed IV narcotic  initially, for the last 24 hours he has only used p.o. Tylenol.  Pain improved and he was restarted on clear liquid diet yesterday which he is tolerating and diet was advanced to low-fat diet gradually today since his lipase trended down to 3000 and other labs were unremarkable.  He is tolerating the diet.  Even though his temperature was up to 100.2, he feels near normal and no other symptoms so patient was discharged home.     Depression  -Psychiatry consulted my started on Remeron  -Appreciate assistance    Incidental finding of urinary bladder wall thickening  Patient reports lower abdominal/pelvic pain which is separate than his epigastric pain going to back from pancreatitis.  UA is negative.  He had decreased urine output and with IV hydration urine output has improved and so urinating more frequently but denies any dysuria, hematuria.  -Urology consulted.  Urine culture was sent, no further work-up as inpatient, outpatient follow-up as needed recommended.  Appreciate assistance.    COVID-19 asymptomatic screening  - PCR pending from ED, no indication for special precautions    Consultations This Hospital Stay   PSYCHIATRY IP CONSULT  UROLOGY IP CONSULT    Code Status   Full Code    Time Spent on this Encounter   I, Shoshana Duong MD, personally saw the patient today and spent greater than 30 minutes discharging this patient.       Shoshana Duong MD  [place] [address] [facility]  [address] [place] [address]  Phone: [phone]  ______________________________________________________________________    Physical Exam   Vital Signs: Temp: 99.9  F (37.7  C) Temp src: Axillary BP: (!) 157/96 Pulse: 74   Resp: 18 SpO2: 100 % O2 Device: None (Room air)    Weight: 174 lbs 0 oz    General: AAOx3, appears comfortable.  HEENT: PERRLA EOMI. Mucosa moist.   Lungs: Bilateral equal air entry. Clear to auscultation, normal work of breathing.   CVS: S1S2 regular, no tachycardia or  murmur.   Abdomen: Soft, mild epigastric tenderness, ND. BS heard.  MSK: No edema or deformities.  Neuro: AAOX3. CN 2-12 normal. Strength symmetrical.  Skin: No rash.          Primary Care Physician   Physician No Ref-Primary    Discharge Orders      Reason for your hospital stay    Acute pancreatitis     Follow-up and recommended labs and tests     Follow up with primary care provider, as needed.     Activity    Your activity upon discharge: activity as tolerated     When to contact your care team    Call your primary doctor if you have any of the following: increased pain or nausea vomiting.     Diet    Follow this diet upon discharge: Orders Placed This Encounter      Low Fat Diet       Significant Results and Procedures   Most Recent 3 CBC's:  Recent Labs   Lab Test 01/12/21  0854 01/11/21  1102 01/10/21  1140   WBC 9.0 10.0 10.7   HGB 13.4 14.0 14.9   MCV 98 98 100    167 187     Most Recent 3 BMP's:  Recent Labs   Lab Test 01/12/21  0854 01/11/21  1102 01/10/21  1140    136 138   POTASSIUM 3.4 3.7 4.0   CHLORIDE 104 102 104   CO2 30 29 29   BUN 5* 5* 5*   CR 0.64* 0.68 0.70   ANIONGAP 4 5 5   QUANG 8.7 8.7 8.9   * 93 99     Most Recent 2 LFT's:  Recent Labs   Lab Test 01/12/21  0854 01/11/21  1102   AST 28 38   ALT 58 67   ALKPHOS 71 70   BILITOTAL 1.3 1.1     Most Recent 6 glucoses:  Recent Labs   Lab Test 01/12/21  0854 01/11/21  1102 01/10/21  1140 01/09/21  1431   * 93 99 129*   ,   Results for orders placed or performed during the hospital encounter of 01/09/21   Abdomen US, limited (RUQ only)    Narrative    US ABDOMEN LIMITED 1/9/2021 4:28 PM    CLINICAL HISTORY: Pancreatitis.    TECHNIQUE: Limited abdominal ultrasound.    COMPARISON: None.    FINDINGS:    GALLBLADDER: The gallbladder is normal. No gallstones, wall  thickening, or pericholecystic fluid. Negative sonographic Acosta's  sign.    BILE DUCTS: There is no biliary dilatation. The common duct measures 3  mm.    LIVER:  The liver is increased in echogenicity without focal mass.     RIGHT KIDNEY: Unremarkable.    PANCREAS: The visualized portions of the pancreas are normal.    No ascites.      Impression    IMPRESSION: Fatty infiltration of liver. No gallstones or bile duct  dilatation. Right kidney is unremarkable.    MAURI KAUFFMAN MD   CT Abdomen Pelvis w Contrast    Narrative    CT ABDOMEN PELVIS W CONTRAST 1/9/2021 10:01 PM    CLINICAL HISTORY: Abd pain, acute, generalized; acute pancreatitis,  worsening abdominal pain.    TECHNIQUE: CT scan of the abdomen and pelvis was performed following  injection of IV contrast. Multiplanar reformats were obtained. Dose  reduction techniques were used.  CONTRAST: 81 mL Isovue-[id number]    COMPARISON: Abdominal ultrasound 1/9/2021.    FINDINGS:   LOWER CHEST: Normal.    HEPATOBILIARY: Fatty infiltration of liver. Gallbladder is  unremarkable.    PANCREAS: Significant surrounding pancreatic inflammatory changes are  present with areas of mesenteric infiltrative changes and mild  ascites. Findings are consistent with acute pancreatitis.    SPLEEN: Normal.    ADRENAL GLANDS: Normal.    KIDNEYS/BLADDER: Kidneys are unremarkable. No hydronephrosis.  Circumferential bladder wall thickening is present. UTI or changes  related to neurogenic bladder are possible.    BOWEL: No bowel obstruction, diverticulitis or appendicitis.    LYMPH NODES: No enlarged abdominal or pelvic lymph nodes.    VASCULATURE: Unremarkable.    PELVIC ORGANS: Prostate gland appears normal in size. Rectum is  unremarkable. Moderate amount of free pelvic fluid is present.    ADDITIONAL FINDINGS: None.    MUSCULOSKELETAL: Normal.      Impression    IMPRESSION:   1.  Acute pancreatitis with pancreatic ascites and inflammatory  changes in the anterior abdominal mesentery.    2.  Circumferential bladder wall thickening possibly UTI. Neurogenic  bladder can appear similar. No hydronephrosis.    3.  Fatty infiltration of the liver.    MAURI  MD DALY       Discharge Medications   Current Discharge Medication List      START taking these medications    Details   folic acid (FOLVITE) 1 MG tablet Take 1 tablet (1 mg) by mouth daily  Qty:      Associated Diagnoses: Acute pancreatitis, unspecified complication status, unspecified pancreatitis type      mirtazapine (REMERON) 15 MG tablet Take 1 tablet (15 mg) by mouth At Bedtime  Qty: 30 tablet, Refills: 0    Associated Diagnoses: Current severe episode of major depressive disorder without psychotic features without prior episode (H)      omeprazole (PRILOSEC OTC) 20 MG EC tablet Take 1 tablet (20 mg) by mouth daily  Qty: 7 tablet, Refills: 0    Associated Diagnoses: Acute pancreatitis, unspecified complication status, unspecified pancreatitis type      thiamine (B-1) 100 MG tablet Take 1 tablet (100 mg) by mouth daily  Qty:      Associated Diagnoses: Acute pancreatitis, unspecified complication status, unspecified pancreatitis type           Allergies   No Known Allergies

## 2021-03-21 ENCOUNTER — HEALTH MAINTENANCE LETTER (OUTPATIENT)
Age: 26
End: 2021-03-21

## 2021-09-05 ENCOUNTER — HEALTH MAINTENANCE LETTER (OUTPATIENT)
Age: 26
End: 2021-09-05

## 2021-09-28 NOTE — PROGRESS NOTES
Lloyd is a 26 year old who is being evaluated via a billable video visit.      How would you like to obtain your AVS? MyChart  If the video visit is dropped, the invitation should be resent by: Text to cell phone: [phone]  Will anyone else be joining your video visit? No      Video Start Time: 12:08 PM        Subjective   Lloyd is a 26 year old who presents for the following health issues: hosp f/u  HPI       Hospital Follow-up Visit:    Hospital/Nursing Home/IP Rehab Facility: [facility]  Date of Admission: 8/29/21  Date of Discharge: 9/2/21  Reason(s) for Admission: Pancreatitis (alcohol induced)      Was your hospitalization related to COVID-19? No   Problems taking medications regularly:  None  Medication changes since discharge: None  Problems adhering to non-medication therapy:  None    Summary of hospitalization: hosp records not avail  Diagnostic Tests/Treatments reviewed.  Follow up needed: addiction medicine  Other Healthcare Providers Involved in Patient s Care:           Update since discharge: improved.       Post Discharge Medication Reconciliation: discharge medications reconciled and changed, per note/orders.  Plan of care communicated with patient                Pt again admitted for pancreatitis as noted above and also at [facility] in Jan of this year for the same problem  After hospitalization in Jan was able to quit drinking for a few months, but then started drinking again.  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        Review of Systems   Constitutional, HEENT, cardiovascular, pulmonary, GI, , musculoskeletal, neuro, skin, endocrine and psych systems are negative, except as otherwise noted.      Objective           Vitals:  No vitals were obtained today due to virtual visit.    Physical Exam   GENERAL: Healthy, alert and no distress  EYES: Eyes grossly normal to inspection.  No discharge or erythema, or obvious scleral/conjunctival abnormalities.  RESP: No audible wheeze, cough, or visible cyanosis.  No visible retractions or increased work of breathing.    SKIN: Visible skin clear. No significant rash, abnormal pigmentation or lesions.  NEURO: Cranial nerves grossly intact.  Mentation and speech appropriate for age.  PSYCH: Mentation appears normal    Assessment and Plan:     (F10.20) Alcoholism (H)  (primary encounter diagnosis)  Comment: pt reports no etoh use since getting out of the hospital but his risk for relapse is high. He is open to ref for chem dep eval. Also recd he get established with PCP here. Bring in records from hospitalization from [place]  Plan: MENTAL HEALTH REFERRAL  - Adult; Addiction         Medicine Provider; Addiction Medicine         Evaluation & Treatment; Addiction Med Consult,         Eval & Treat - [place] [phone] (Pt         hospitalized twice for alcohol induced         pancreatitis, has untreated depressi...            (K85.20) Alcohol-induced acute pancreatitis, unspecified complication  status  Comment: denies any ongoing abd pain  Plan: MENTAL HEALTH REFERRAL  - Adult; Addiction         Medicine Provider; Addiction Medicine         Evaluation & Treatment; Addiction Med Consult,         [facility] [phone] (Pt         hospitalized twice for alcohol induced         pancreatitis, has untreated depressi...            (F33.1) Moderate episode of recurrent major depressive disorder (H)  Comment:   Plan: MENTAL HEALTH REFERRAL  - Adult; Addiction         Medicine Provider; Addiction Medicine         Evaluation & Treatment; Addiction Med Consult,         [facility] [phone] (Pt         hospitalized twice for alcohol induced         pancreatitis, has untreated depressi...            Danielle Mcgovern PA-C              Video-Visit Details    Type of service:  Video Visit    Video End Time:12:29 PM    Originating Location (pt. Location): Home    Distant Location (provider location):  [facility]     Platform used for Video Visit: Vu

## 2021-09-29 ENCOUNTER — VIRTUAL VISIT (OUTPATIENT)
Dept: FAMILY MEDICINE | Facility: CLINIC | Age: 26
End: 2021-09-29
Payer: COMMERCIAL

## 2021-09-29 DIAGNOSIS — K85.20 ALCOHOL-INDUCED ACUTE PANCREATITIS, UNSPECIFIED COMPLICATION STATUS: ICD-10-CM

## 2021-09-29 DIAGNOSIS — F10.20 ALCOHOLISM (H): Primary | ICD-10-CM

## 2021-09-29 DIAGNOSIS — F33.1 MODERATE EPISODE OF RECURRENT MAJOR DEPRESSIVE DISORDER (H): ICD-10-CM

## 2021-09-29 PROCEDURE — 99214 OFFICE O/P EST MOD 30 MIN: CPT | Mod: 95 | Performed by: PHYSICIAN ASSISTANT

## 2021-09-29 ASSESSMENT — PATIENT HEALTH QUESTIONNAIRE - PHQ9: SUM OF ALL RESPONSES TO PHQ QUESTIONS 1-9: 9

## 2021-09-30 ENCOUNTER — PATIENT OUTREACH (OUTPATIENT)
Dept: NURSING | Facility: CLINIC | Age: 26
End: 2021-09-30
Payer: COMMERCIAL

## 2021-09-30 ASSESSMENT — ACTIVITIES OF DAILY LIVING (ADL): DEPENDENT_IADLS:: INDEPENDENT

## 2021-09-30 NOTE — PROGRESS NOTES
Clinic Care Coordination Contact    Clinic Care Coordination Contact  OUTREACH    Referral Information:  Referral Source: PCP    Primary Diagnosis: CD    Chief Complaint   Patient presents with     Clinic Care Coordination - Initial        Universal Utilization: 2 hospitalizations in the past year for alcohol induced panreatitis  Clinic Utilization  Difficulty keeping appointments:: No  Compliance Concerns: No  No-Show Concerns: No  No PCP office visit in Past Year: No  Utilization    Hospital Admissions  1             ED Visits  1             No Show Count (past year)  0                Current as of: 9/29/2021  2:01 PM            Clinical Concerns:  CC POOJA spoke with pt regarding his overall wellbeing. Pt shared that he is doing well. He is interested in speaking with Addiction Medicine through [facility] but is not interested in other resources at this time. Pt took phone number for Addiction Medicine and understands he can call anything to set up an appointment.     Pt stated he has no needs for CC POOJA at this time.    Current Medical Concerns:  pancreatitis  Current Behavioral Concerns: alcohol use    Education Provided to patient: CC role   Pain  Pain (GOAL):: No  Health Maintenance Reviewed: Due/Overdue   Clinical Pathway: None    Medication Management:  Medication review status: Medications reviewed and no changes reported per patient.            Functional Status:  Dependent ADLs:: Independent  Dependent IADLs:: Independent  Bed or wheelchair confined:: No  Mobility Status: Independent  Fallen 2 or more times in the past year?: No  Any fall with injury in the past year?: No    Living Situation:  Type of residence:: Private home - [facility]    Lifestyle & Psychosocial Needs:    Social Determinants of Health     Tobacco Use: Low Risk      Smoking Tobacco Use: Never Smoker     Smokeless Tobacco Use: Never Used   Alcohol Use:      Frequency of Alcohol Consumption:      Average Number of Drinks:      Frequency of Binge  Drinking:    Financial Resource Strain:      Difficulty of Paying Living Expenses:    Food Insecurity:      Worried About Running Out of Food in the Last Year:      Ran Out of Food in the Last Year:    Transportation Needs:      Lack of Transportation (Medical):      Lack of Transportation (Non-Medical):    Physical Activity:      Days of Exercise per Week:      Minutes of Exercise per Session:    Stress:      Feeling of Stress :    Social Connections:      Frequency of Communication with Friends and Family:      Frequency of Social Gatherings with Friends and Family:      Attends Judaism Services:      Active Member of Clubs or Organizations:      Attends Club or Organization Meetings:      Marital Status:    Intimate Partner Violence:      Fear of Current or Ex-Partner:      Emotionally Abused:      Physically Abused:      Sexually Abused:    Depression: At risk     PHQ-2 Score: 3   Housing Stability:      Unable to Pay for Housing in the Last Year:      Number of Places Lived in the Last Year:      Unstable Housing in the Last Year:      Diet:: Regular  Inadequate nutrition (GOAL):: No  Tube Feeding: No  Inadequate activity/exercise (GOAL):: No  Significant changes in sleep pattern (GOAL): No  Transportation means:: Regular car     Judaism or spiritual beliefs that impact treatment:: No  Mental health DX:: Yes  Mental health DX how managed:: None  Mental health management concern (GOAL):: No  Chemical Dependency Status: Current Concern  Informal Support system:: Family      Resources and Interventions:  Current Resources:      Community Resources: None  Supplies used at home:: None  Equipment Currently Used at Home: none  Employment Status: employed full-time         Advance Care Plan/Directive  Advanced Care Plans/Directives on file:: No    Referrals Placed: CD     Patient/Caregiver understanding: Pt reports understanding and denies any additional questions or concerns at this times. POOJA CC engaged in [facility]  communication during encounter.    Plan: At this time, pt denies outstanding need for connection or referral to resources or assistance navigating recommended follow up care. No further outreaches will be made at this time unless a new referral is made or a change in the pt's status occurs. Patient was provided with [facility] contact information and encouraged to call with any questions or concerns.  Juliann Huang [facility]  Clinic Care Coordinator  [facility] Flavia Kent  [facility]  [phone]  [email or web address]

## 2021-09-30 NOTE — LETTER
[facility] CARE COORDINATION  [address] [place] [address]    September 30, 2021    Lloyd Dillon  [address]  [facility] [address]      Dear Lloyd,    I am a clinic care coordinator who works with [facility]. I wanted to thank you for spending the time to talk with me.  Below is a description of clinic care coordination and how I can further assist you.      The clinic care coordination team is made up of a registered nurse,  and community health worker who understand the health care system. The goal of clinic care coordination is to help you manage your health and improve access to the health care system in the most efficient manner. The team can assist you in meeting your health care goals by providing education, coordinating services, strengthening the communication among your providers and supporting you with any resource needs.    Please feel free to contact me at [phone] with any questions or concerns. We are focused on providing you with the highest-quality healthcare experience possible and that all starts with you.     Sincerely,     DENISE Mazariegos

## 2021-11-03 ENCOUNTER — HOSPITAL ENCOUNTER (OUTPATIENT)
Facility: CLINIC | Age: 26
Setting detail: OBSERVATION
Discharge: HOME | End: 2021-11-04
Attending: EMERGENCY MEDICINE | Admitting: INTERNAL MEDICINE
Payer: COMMERCIAL

## 2021-11-03 DIAGNOSIS — K85.20 ALCOHOL-INDUCED ACUTE PANCREATITIS, UNSPECIFIED COMPLICATION STATUS: ICD-10-CM

## 2021-11-03 LAB
ALBUMIN SERPL-MCNC: 4.4 G/DL (ref 3.4–5)
ALP SERPL-CCNC: 109 U/L (ref 40–150)
ALT SERPL W P-5'-P-CCNC: 80 U/L (ref 0–70)
ANION GAP SERPL CALCULATED.3IONS-SCNC: 5 MMOL/L (ref 3–14)
AST SERPL W P-5'-P-CCNC: 54 U/L (ref 0–45)
BASOPHILS # BLD AUTO: 0 10E3/UL (ref 0–0.2)
BASOPHILS NFR BLD AUTO: 0 %
BILIRUB DIRECT SERPL-MCNC: 0.3 MG/DL (ref 0–0.2)
BILIRUB SERPL-MCNC: 1.2 MG/DL (ref 0.2–1.3)
BUN SERPL-MCNC: 9 MG/DL (ref 7–30)
CALCIUM SERPL-MCNC: 9.4 MG/DL (ref 8.5–10.1)
CHLORIDE BLD-SCNC: 102 MMOL/L (ref 94–109)
CO2 SERPL-SCNC: 29 MMOL/L (ref 20–32)
CREAT SERPL-MCNC: 0.9 MG/DL (ref 0.66–1.25)
EOSINOPHIL # BLD AUTO: 0.1 10E3/UL (ref 0–0.7)
EOSINOPHIL NFR BLD AUTO: 1 %
ERYTHROCYTE [DISTWIDTH] IN BLOOD BY AUTOMATED COUNT: 11.9 % (ref 10–15)
GFR SERPL CREATININE-BSD FRML MDRD: >90 ML/MIN/1.73M2
GLUCOSE BLD-MCNC: 135 MG/DL (ref 70–99)
HCT VFR BLD AUTO: 48.3 % (ref 40–53)
HGB BLD-MCNC: 16.5 G/DL (ref 13.3–17.7)
HOLD SPECIMEN: NORMAL
IMM GRANULOCYTES # BLD: 0 10E3/UL
IMM GRANULOCYTES NFR BLD: 0 %
LIPASE SERPL-CCNC: ABNORMAL U/L (ref 73–393)
LYMPHOCYTES # BLD AUTO: 1.3 10E3/UL (ref 0.8–5.3)
LYMPHOCYTES NFR BLD AUTO: 14 %
MCH RBC QN AUTO: 32.9 PG (ref 26.5–33)
MCHC RBC AUTO-ENTMCNC: 34.2 G/DL (ref 31.5–36.5)
MCV RBC AUTO: 96 FL (ref 78–100)
MONOCYTES # BLD AUTO: 0.8 10E3/UL (ref 0–1.3)
MONOCYTES NFR BLD AUTO: 8 %
NEUTROPHILS # BLD AUTO: 7 10E3/UL (ref 1.6–8.3)
NEUTROPHILS NFR BLD AUTO: 77 %
NRBC # BLD AUTO: 0 10E3/UL
NRBC BLD AUTO-RTO: 0 /100
PLATELET # BLD AUTO: 213 10E3/UL (ref 150–450)
POTASSIUM BLD-SCNC: 3.5 MMOL/L (ref 3.4–5.3)
PROT SERPL-MCNC: 8.2 G/DL (ref 6.8–8.8)
RBC # BLD AUTO: 5.02 10E6/UL (ref 4.4–5.9)
SARS-COV-2 RNA RESP QL NAA+PROBE: NEGATIVE
SODIUM SERPL-SCNC: 136 MMOL/L (ref 133–144)
WBC # BLD AUTO: 9.2 10E3/UL (ref 4–11)

## 2021-11-03 PROCEDURE — C9113 INJ PANTOPRAZOLE SODIUM, VIA: HCPCS | Performed by: HOSPITALIST

## 2021-11-03 PROCEDURE — 96374 THER/PROPH/DIAG INJ IV PUSH: CPT

## 2021-11-03 PROCEDURE — 250N000011 HC RX IP 250 OP 636: Performed by: HOSPITALIST

## 2021-11-03 PROCEDURE — 96375 TX/PRO/DX INJ NEW DRUG ADDON: CPT

## 2021-11-03 PROCEDURE — 36415 COLL VENOUS BLD VENIPUNCTURE: CPT | Performed by: EMERGENCY MEDICINE

## 2021-11-03 PROCEDURE — 258N000003 HC RX IP 258 OP 636: Performed by: INTERNAL MEDICINE

## 2021-11-03 PROCEDURE — 83690 ASSAY OF LIPASE: CPT | Performed by: EMERGENCY MEDICINE

## 2021-11-03 PROCEDURE — 250N000013 HC RX MED GY IP 250 OP 250 PS 637: Performed by: INTERNAL MEDICINE

## 2021-11-03 PROCEDURE — H0001 ALCOHOL AND/OR DRUG ASSESS: HCPCS | Performed by: COUNSELOR

## 2021-11-03 PROCEDURE — 250N000011 HC RX IP 250 OP 636: Performed by: INTERNAL MEDICINE

## 2021-11-03 PROCEDURE — 82248 BILIRUBIN DIRECT: CPT | Performed by: EMERGENCY MEDICINE

## 2021-11-03 PROCEDURE — 96376 TX/PRO/DX INJ SAME DRUG ADON: CPT

## 2021-11-03 PROCEDURE — 99207 PR CDG-MDM COMPONENT: MEETS HIGH - UP CODED: CPT | Performed by: INTERNAL MEDICINE

## 2021-11-03 PROCEDURE — 80048 BASIC METABOLIC PNL TOTAL CA: CPT | Performed by: EMERGENCY MEDICINE

## 2021-11-03 PROCEDURE — 96361 HYDRATE IV INFUSION ADD-ON: CPT

## 2021-11-03 PROCEDURE — G0378 HOSPITAL OBSERVATION PER HR: HCPCS

## 2021-11-03 PROCEDURE — 99207 PR NO CHARGE LOS: CPT | Performed by: HOSPITALIST

## 2021-11-03 PROCEDURE — 258N000001 HC RX 258: Performed by: INTERNAL MEDICINE

## 2021-11-03 PROCEDURE — 87635 SARS-COV-2 COVID-19 AMP PRB: CPT | Performed by: EMERGENCY MEDICINE

## 2021-11-03 PROCEDURE — 99220 PR INITIAL OBSERVATION CARE,LEVEL III: CPT | Performed by: INTERNAL MEDICINE

## 2021-11-03 PROCEDURE — 99285 EMERGENCY DEPT VISIT HI MDM: CPT | Mod: 25

## 2021-11-03 PROCEDURE — 85025 COMPLETE CBC W/AUTO DIFF WBC: CPT | Performed by: EMERGENCY MEDICINE

## 2021-11-03 PROCEDURE — 258N000003 HC RX IP 258 OP 636: Performed by: EMERGENCY MEDICINE

## 2021-11-03 PROCEDURE — C9803 HOPD COVID-19 SPEC COLLECT: HCPCS

## 2021-11-03 PROCEDURE — 250N000011 HC RX IP 250 OP 636: Performed by: EMERGENCY MEDICINE

## 2021-11-03 RX ORDER — OXYCODONE HYDROCHLORIDE 5 MG/1
5 TABLET ORAL EVERY 4 HOURS PRN
Status: DISCONTINUED | OUTPATIENT
Start: 2021-11-03 | End: 2021-11-04 | Stop reason: HOSPADM

## 2021-11-03 RX ORDER — PROCHLORPERAZINE 25 MG
25 SUPPOSITORY, RECTAL RECTAL EVERY 12 HOURS PRN
Status: DISCONTINUED | OUTPATIENT
Start: 2021-11-03 | End: 2021-11-04 | Stop reason: HOSPADM

## 2021-11-03 RX ORDER — AMOXICILLIN 250 MG
1 CAPSULE ORAL 2 TIMES DAILY PRN
Status: DISCONTINUED | OUTPATIENT
Start: 2021-11-03 | End: 2021-11-04 | Stop reason: HOSPADM

## 2021-11-03 RX ORDER — ONDANSETRON 2 MG/ML
4 INJECTION INTRAMUSCULAR; INTRAVENOUS EVERY 30 MIN PRN
Status: DISCONTINUED | OUTPATIENT
Start: 2021-11-03 | End: 2021-11-03

## 2021-11-03 RX ORDER — NALOXONE HYDROCHLORIDE 0.4 MG/ML
0.2 INJECTION, SOLUTION INTRAMUSCULAR; INTRAVENOUS; SUBCUTANEOUS
Status: DISCONTINUED | OUTPATIENT
Start: 2021-11-03 | End: 2021-11-04 | Stop reason: HOSPADM

## 2021-11-03 RX ORDER — KETOROLAC TROMETHAMINE 15 MG/ML
30 INJECTION, SOLUTION INTRAMUSCULAR; INTRAVENOUS ONCE
Status: DISCONTINUED | OUTPATIENT
Start: 2021-11-03 | End: 2021-11-03

## 2021-11-03 RX ORDER — MULTIPLE VITAMINS W/ MINERALS TAB 9MG-400MCG
1 TAB ORAL DAILY
COMMUNITY

## 2021-11-03 RX ORDER — LANOLIN ALCOHOL/MO/W.PET/CERES
3 CREAM (GRAM) TOPICAL
Status: DISCONTINUED | OUTPATIENT
Start: 2021-11-03 | End: 2021-11-04 | Stop reason: HOSPADM

## 2021-11-03 RX ORDER — NALOXONE HYDROCHLORIDE 0.4 MG/ML
0.4 INJECTION, SOLUTION INTRAMUSCULAR; INTRAVENOUS; SUBCUTANEOUS
Status: DISCONTINUED | OUTPATIENT
Start: 2021-11-03 | End: 2021-11-04 | Stop reason: HOSPADM

## 2021-11-03 RX ORDER — PROCHLORPERAZINE MALEATE 5 MG
10 TABLET ORAL EVERY 6 HOURS PRN
Status: DISCONTINUED | OUTPATIENT
Start: 2021-11-03 | End: 2021-11-04 | Stop reason: HOSPADM

## 2021-11-03 RX ORDER — FOLIC ACID 1 MG/1
1 TABLET ORAL DAILY
Status: DISCONTINUED | OUTPATIENT
Start: 2021-11-03 | End: 2021-11-04 | Stop reason: HOSPADM

## 2021-11-03 RX ORDER — SODIUM CHLORIDE 9 MG/ML
INJECTION, SOLUTION INTRAVENOUS CONTINUOUS
Status: DISCONTINUED | OUTPATIENT
Start: 2021-11-03 | End: 2021-11-03

## 2021-11-03 RX ORDER — ZINC GLUCONATE 50 MG
50 TABLET ORAL DAILY
COMMUNITY

## 2021-11-03 RX ORDER — ONDANSETRON 2 MG/ML
4 INJECTION INTRAMUSCULAR; INTRAVENOUS EVERY 6 HOURS PRN
Status: DISCONTINUED | OUTPATIENT
Start: 2021-11-03 | End: 2021-11-04 | Stop reason: HOSPADM

## 2021-11-03 RX ORDER — LANOLIN ALCOHOL/MO/W.PET/CERES
100 CREAM (GRAM) TOPICAL DAILY
Status: DISCONTINUED | OUTPATIENT
Start: 2021-11-03 | End: 2021-11-04 | Stop reason: HOSPADM

## 2021-11-03 RX ORDER — MORPHINE SULFATE 4 MG/ML
4 INJECTION, SOLUTION INTRAMUSCULAR; INTRAVENOUS ONCE
Status: COMPLETED | OUTPATIENT
Start: 2021-11-03 | End: 2021-11-03

## 2021-11-03 RX ORDER — KETOROLAC TROMETHAMINE 15 MG/ML
30 INJECTION, SOLUTION INTRAMUSCULAR; INTRAVENOUS EVERY 6 HOURS PRN
Status: DISCONTINUED | OUTPATIENT
Start: 2021-11-03 | End: 2021-11-03

## 2021-11-03 RX ORDER — LIDOCAINE 40 MG/G
CREAM TOPICAL
Status: DISCONTINUED | OUTPATIENT
Start: 2021-11-03 | End: 2021-11-04 | Stop reason: HOSPADM

## 2021-11-03 RX ORDER — HYDROMORPHONE HCL IN WATER/PF 6 MG/30 ML
0.2 PATIENT CONTROLLED ANALGESIA SYRINGE INTRAVENOUS
Status: DISCONTINUED | OUTPATIENT
Start: 2021-11-03 | End: 2021-11-04 | Stop reason: HOSPADM

## 2021-11-03 RX ORDER — AMOXICILLIN 250 MG
2 CAPSULE ORAL 2 TIMES DAILY PRN
Status: DISCONTINUED | OUTPATIENT
Start: 2021-11-03 | End: 2021-11-04 | Stop reason: HOSPADM

## 2021-11-03 RX ORDER — ONDANSETRON 4 MG/1
4 TABLET, ORALLY DISINTEGRATING ORAL EVERY 6 HOURS PRN
Status: DISCONTINUED | OUTPATIENT
Start: 2021-11-03 | End: 2021-11-04 | Stop reason: HOSPADM

## 2021-11-03 RX ORDER — DEXTROSE MONOHYDRATE, SODIUM CHLORIDE, SODIUM LACTATE, POTASSIUM CHLORIDE, CALCIUM CHLORIDE 5; 600; 310; 179; 20 G/100ML; MG/100ML; MG/100ML; MG/100ML; MG/100ML
INJECTION, SOLUTION INTRAVENOUS CONTINUOUS
Status: DISCONTINUED | OUTPATIENT
Start: 2021-11-03 | End: 2021-11-04 | Stop reason: HOSPADM

## 2021-11-03 RX ADMIN — FOLIC ACID 1 MG: 1 TABLET ORAL at 09:22

## 2021-11-03 RX ADMIN — HYDROMORPHONE HYDROCHLORIDE 0.2 MG: 0.2 INJECTION, SOLUTION INTRAMUSCULAR; INTRAVENOUS; SUBCUTANEOUS at 21:10

## 2021-11-03 RX ADMIN — KETOROLAC TROMETHAMINE 30 MG: 15 INJECTION, SOLUTION INTRAMUSCULAR; INTRAVENOUS at 09:31

## 2021-11-03 RX ADMIN — SODIUM CHLORIDE, POTASSIUM CHLORIDE, SODIUM LACTATE AND CALCIUM CHLORIDE 1000 ML: 600; 310; 30; 20 INJECTION, SOLUTION INTRAVENOUS at 10:58

## 2021-11-03 RX ADMIN — SODIUM CHLORIDE 1000 ML: 9 INJECTION, SOLUTION INTRAVENOUS at 06:33

## 2021-11-03 RX ADMIN — SODIUM CHLORIDE, POTASSIUM CHLORIDE, SODIUM LACTATE AND CALCIUM CHLORIDE 1000 ML: 600; 310; 30; 20 INJECTION, SOLUTION INTRAVENOUS at 09:42

## 2021-11-03 RX ADMIN — MORPHINE SULFATE 4 MG: 4 INJECTION INTRAVENOUS at 06:31

## 2021-11-03 RX ADMIN — SODIUM CHLORIDE 1000 ML: 9 INJECTION, SOLUTION INTRAVENOUS at 05:17

## 2021-11-03 RX ADMIN — ONDANSETRON 4 MG: 2 INJECTION INTRAMUSCULAR; INTRAVENOUS at 06:33

## 2021-11-03 RX ADMIN — DEXTROSE MONOHYDRATE, SODIUM CHLORIDE, SODIUM LACTATE, POTASSIUM CHLORIDE, CALCIUM CHLORIDE: 5; 600; 310; 179; 20 INJECTION, SOLUTION INTRAVENOUS at 09:40

## 2021-11-03 RX ADMIN — PANTOPRAZOLE SODIUM 40 MG: 40 INJECTION, POWDER, FOR SOLUTION INTRAVENOUS at 09:22

## 2021-11-03 RX ADMIN — HYDROMORPHONE HYDROCHLORIDE 0.2 MG: 0.2 INJECTION, SOLUTION INTRAMUSCULAR; INTRAVENOUS; SUBCUTANEOUS at 09:22

## 2021-11-03 RX ADMIN — HYDROMORPHONE HYDROCHLORIDE 0.2 MG: 0.2 INJECTION, SOLUTION INTRAMUSCULAR; INTRAVENOUS; SUBCUTANEOUS at 18:55

## 2021-11-03 RX ADMIN — THIAMINE HCL TAB 100 MG 100 MG: 100 TAB at 09:22

## 2021-11-03 RX ADMIN — HYDROMORPHONE HYDROCHLORIDE 0.2 MG: 0.2 INJECTION, SOLUTION INTRAMUSCULAR; INTRAVENOUS; SUBCUTANEOUS at 16:01

## 2021-11-03 ASSESSMENT — ENCOUNTER SYMPTOMS
DIARRHEA: 0
ABDOMINAL PAIN: 1
NAUSEA: 1
SHORTNESS OF BREATH: 0
BACK PAIN: 1
FEVER: 0
COUGH: 0
DYSURIA: 0

## 2021-11-03 ASSESSMENT — MIFFLIN-ST. JEOR: SCORE: 1741.49

## 2021-11-03 NOTE — H&P
[facility]    History and Physical - Hospitalist Service       Date of Admission:  11/3/2021    Assessment & Plan      Lloyd Dillon is a 26 year old male admitted on 11/3/2021. He presents to the emergency department with predominantly back pain, though also with some nausea and epigastric discomfort similar to prior episodes of pancreatitis and is found to have an elevated lipase.    Acute pancreatitis: Very likely alcohol induced pancreatitis as patient has a history of this x2, last in August while in [place].  Does not describe a significant increase in alcohol use prior to pancreatitis episode, has actually decreased his use since August.  As below, drinks approximately 3 alcoholic beverages per sitting approximately one half of the days of the month.  Drink October 30 prior to onset of symptoms October 31.  Still able to tolerate oral intake up until 11/2 evening  -Received 2 L normal saline in the emergency department; ordering additional 2 L lactated Ringer's over 2 hours for aggressive volume resuscitation early in course of pancreatitis  -[facility] CMP  -Outside records will need to be requested from [facility] in [place] after 9:30 AM when the records department is open.  These can be faxed and scanned into chart.  Patient has access to an online portal of his results with pancreatitis on CT, no gallstones on upper quadrant ultrasound.  -Note triglycerides in the 50s during January admission; not repeating lipid panel at this time  -Alcohol cessation  -Advance diet as tolerated; patient has actually been eating up until this morning when he developed dry heaves around 3 AM.  Pain is not severe, he is actually declined narcotic pain medications in the emergency department  -As needed oral oxycodone, low-dose IV Dilaudid is available if needed for pain  -Gastroenterology consult; could consider repeat imaging including MRCP with recurrent episodes of pancreatitis.   Patient does have an alcohol use history, though also note that hepatic enzymes are not necessarily consistent with alcohol-induced injury with ALT of 80, AST of 54.  With prior negative CT and ultrasound, the studies seem less likely to be beneficial  -Admit to observation status as patient with minimal pain and had been tolerating oral intake    High risk alcohol use: Patient now with a third episode of pancreatitis attributed to alcohol use.  He reports no history of alcohol withdrawal.  Last drink was 10/30/2021 and reports that he drinks approximately 3 alcoholic beverages per sitting approximately one half of the days of the month.  -Chemical dependency consultation; patient had initially been scheduled for a chemical dependency visit on the 11th, though this was canceled and he was rescheduled with a primary care provider.  He has interest in meeting with a chemical dependency counselor  -Psychiatry consult placed.  Patient attributes his alcohol use to boredom and anxiety, he reports himself and interest in pharmacotherapies going forward, though not certain they are indicated as of yet  -Monitor for withdrawal, no history of such  -Continue prior to admission folic acid and thiamine    Depression with anxiety: Was seen by psychiatry during January admission and initiated on Remeron.  No longer taking this medication per his report.       Diet: NPO for Medical/Clinical Reasons Except for: No Exceptions    DVT Prophylaxis: Ambulate every shift  Griffith Catheter: Not present  Central Lines: None  Code Status:  Full code.  Confirmed with patient on admission    Clinically Significant Risk Factors Present on Admission                   Disposition Plan   Expected discharge:  likely 11/4/2021 recommended to prior living arrangement once pain controlled on oral medications, tolerating adequate oral intake.     The patient's care was discussed with the Patient and Dr. Pal in the emergency department.Tianna Singh  Sachin Anna MD  [facility]  Securely message with the [employer] Web Console (learn more here)  Text page via [facility] Paging/Directory        ______________________________________________________________________    Chief Complaint   Back pain, nausea    History is obtained from the patient, chart review, discussion with ER provider    History of Present Illness   Lloyd Dillon is a 26 year old male who presents to the emergency department with dry heaves and abdominal pain, back pain.  With history of pancreatitis and discomfort reminiscent of prior episodes a lipase was obtained and elevated at 27,000.    Patient with a history of anxiety and depression, also with a history of recurrent pancreatitis.  First episode of pancreatitis was in January 2021, attributed to alcohol use.  Imaging negative for biliary obstruction at that time, 3-day hospitalization.  Patient had a recurrent episode of pancreatitis while in [place] in August.  Was hospitalized for 1 day.  CT and ultrasound negative at that time.  Records department will be open at 9:30 AM central time, though patient does have access to his patient portal on his phone from this.  Has remained on thiamine and folic acid since August hospitalization.  He also tells me that he cut back on his drinking significantly following his second episode of pancreatitis.  He does report that October was a hard month for him and tells me that he was drinking approximately 3 alcoholic beverages on average every other day or approximately one half of the days of the month.  Last drink was October 30.  Rosanky generally well October 31 until approximately 8 PM when he had a cramping sensation in his stomach reminiscent of pancreatitis.  Was still able to eat and drink, though appetite was decreased.  No fevers.  He does have night sweats, though tells me that this is chronic for him.  No shaking chills.  He went to work on Monday, on Tuesday still had  discomfort though was still able to tolerate oral intake and went shopping without difficulty.  Overnight Tuesday, however, at approximately 3 AM, he developed dry heaves.  With this, he decided he should present to the emergency department for further evaluation.      In the emergency department he was found to have pancreatitis by laboratory findings and exam, though he declines narcotic pain medications.  He reports that he has an addictive personality, family history of alcoholism.  Generally his pain is manageable even with physical exam and abdominal palpation.  He reports his alcohol use is attributed to anxiety and boredom, he has interest in speaking with psychiatry as well as chemical dependency.  There was a plan for him to meet with chemical dependency as an outpatient on the 11th, though this appointment was apparently canceled secondary to provider unavailability.  He reports no history of withdrawal, has gone several weeks without alcohol and no tremulousness or withdrawal symptoms in the past.  He is concerned that he has an alcohol problem, however.    Review of Systems    The 10 point Review of Systems is negative other than noted in the HPI or here.   No fevers  Has had some nocturnal diaphoresis, though reports that this has been more longstanding and not associated directly with his pancreatitis episodes    Past Medical History    I have reviewed this patient's medical history and updated it with pertinent information if needed.   Past Medical History:   Diagnosis Date     Pancreatitis      Uncomplicated asthma    Depression with anxiety     Past Surgical History   I have reviewed this patient's surgical history and updated it with pertinent information if needed.  No prior surgical history    Social History   I have reviewed this patient's social history and updated it with pertinent information if needed.  Social History     Tobacco Use     Smoking status: Never Smoker     Smokeless tobacco:  Never Used   Substance Use Topics     Alcohol use: Yes     Comment: social; reports that he drinks approximately 21 alcoholic beverages per week     Drug use: None       Family History     Patient reports no family history of pancreatitis, though multiple family members with alcohol use disorder    Prior to Admission Medications   Prior to Admission Medications   Prescriptions Last Dose Informant Patient Reported? Taking?   folic acid (FOLVITE) 1 MG tablet   No No   Sig: Take 1 tablet (1 mg) by mouth daily   thiamine (B-1) 100 MG tablet   No No   Sig: Take 1 tablet (100 mg) by mouth daily      Facility-Administered Medications: None     Allergies   No Known Allergies    Physical Exam   Vital Signs: Temp: 98.2  F (36.8  C) Temp src: Oral BP: (!) 153/91 Pulse: 96   Resp: 20 SpO2: 100 % O2 Device: None (Room air)    Weight: 170 lbs 0 oz    General Appearance: Generally well-appearing 26-year-old male sitting comfortably in bed. He does not appear acutely ill or toxic   Eyes: No scleral icterus or injection  HEENT: Normocephalic, atraumatic.   Respiratory:  breath sounds are clear bilateral to auscultation, no wheezes, no crackles, no splinting.  Excellent effort  Cardiovascular: Regular rate and rhythm no murmur  GI: Abdomen soft, muscular.  No palpable mass.  No tenderness in right upper quadrant, and minimally reproducible discomfort in the epigastrium.  Lymph/Hematologic: No lower extremity edema  Genitourinary: Not examined   Skin: no rashes, no jaundice, no petechia  Musculoskeletal: Muscular tone and bulk intact in all extremities  Neurologic: Alert, conversant, appropriate conversation.  Mental status grossly intact.    Psychiatric: Normal affect.  Pleasant    Data   Data reviewed today: I reviewed all medications, new labs and imaging results over the last 24 hours. I personally reviewed no images or EKG's today.    Recent Labs   Lab 11/03/21  0458   WBC 9.2   HGB 16.5   MCV 96         POTASSIUM  3.5   CHLORIDE 102   CO2 29   BUN 9   CR 0.90   ANIONGAP 5   QUANG 9.4   *   ALBUMIN 4.4   PROTTOTAL 8.2   BILITOTAL 1.2   ALKPHOS 109   ALT 80*   AST 54*   LIPASE 27,786*

## 2021-11-03 NOTE — PROGRESS NOTES
[facility]    Hospitalist Progress Note    Date of Admission:  11/3/2021    Assessment & Plan       Lloyd Dillon is a 26 year old male admitted on 11/3/2021. He presents to the emergency department with predominantly back pain, though also with some nausea and epigastric discomfort similar to prior episodes of pancreatitis and is found to have an elevated lipase.     Acute pancreatitis: Very likely alcohol induced pancreatitis as patient has a history of this x2, last in August while in [place].  Does not describe a significant increase in alcohol use prior to pancreatitis episode, has actually decreased use since August.  As below, drinks approximately 3 alcoholic beverages per sitting approximately one half of the days of the month.  Drink October 30 prior to onset of symptoms October 31.  Still able to tolerate oral intake up until 11/2 evening  Lipase 27,076, ALT 80, AST 54, total bili 1.2 at admission.  Normal hemogram.  -Received 2 L normal saline in the emergency department;  additional 2 L lactated Ringer's over 2 hours for aggressive volume resuscitation early in course of pancreatitis  -[facility] CMP  -Outside records  requested from [facility] in [place].  Patient has access to an online portal of his results with pancreatitis on CT, no gallstones on upper quadrant ultrasound.  -Note triglycerides in the 50s during January admission; not repeating lipid panel at this time  -Strongly encouraged alcohol cessation  -N.p.o.  -As needed oral oxycodone, as needed IV Toradol, low-dose IV Dilaudid for pain  -Gastroenterology consult; could consider repeat imaging including MRCP with recurrent episodes of pancreatitis.  Patient does have an alcohol use history, though also note that hepatic enzymes are not necessarily consistent with alcohol-induced injury with ALT of 80, AST of 54.  With prior negative CT and ultrasound, the studies seem less likely to be beneficial  -Admit  to observation status as patient with minimal pain and had been tolerating oral intake till recently.     Alcohol use disorder: Patient now with a third episode of pancreatitis attributed to alcohol use.  He reports no history of alcohol withdrawal.  Last drink was 10/30/2021 and reports that he drinks approximately 3 alcoholic beverages per sitting approximately one half of the days of the month.  -Chemical dependency consultation  -Psychiatry consult placed.  Patient attributes his alcohol use to boredom and anxiety, he reports himself and interest in pharmacotherapies going forward, though not certain they are indicated as of yet  -Monitor for withdrawal, no history of such  -Continue prior to admission folic acid and thiamine     Depression with anxiety: Was seen by psychiatry during January admission and initiated on Remeron.  No longer taking this medication per his report.     GERD  -IV PPI while NPO     Diet: NPO for Medical/Clinical Reasons   DVT Prophylaxis: Ambulate every shift  Griffith Catheter: Not present  Central Lines: None  Code Status:  Full code.  Confirmed with patient on admission        Clinically Significant Risk Factors Present on Admission              Disposition Plan     Expected discharge:  likely 11/4/2021 recommended to prior living arrangement once pain controlled on oral medications, tolerating adequate oral intake.        Rita Louise MD  Text Page (7am - 6pm, M-F)  [facility]  Securely message with the [employer] Web Console (learn more here)  Text page via [employer] Paging/Directory      Interval History   Patient was seen in the ED where he was still boarding this morning.  He was sitting up in bed and bending forward complaining of back pain worsening again.  Feels the morphine is wearing off.  Denies nausea vomiting.  No fevers.    -Data reviewed today: I reviewed all new labs and imaging results over the last 24 hours. I personally reviewed labs    Physical  Exam   Temp: 98.2  F (36.8  C) Temp src: Oral BP: (!) 153/91 Pulse: 96   Resp: 20 SpO2: 98 % O2 Device: None (Room air)    Vitals:    11/03/21 0451   Weight: 77.1 kg (170 lb)     Vital Signs with Ranges  Temp:  [98.2  F (36.8  C)] 98.2  F (36.8  C)  Pulse:  [96] 96  Resp:  [20] 20  BP: (153)/(91) 153/91  SpO2:  [98 %-100 %] 98 %  No intake/output data recorded.    Constitutional: Alert, appears uncomfortable, in no acute distress, sitting up in bed leaning forward  Respiratory: Non labored breathing, clear to auscultation bilaterally, no crackles or wheezes  Cardiovascular: Heart sounds regular rate and rhythm, no murmurs, no leg edema  GI: Abdomen is soft, non distended, tender epigastrium.  Hypoactive BS  Skin/Integumen: no rashes, no pressure sores  Neuro: alert, converses appropriately, moving all extremities, fluent speech, no facial asymmetry  Psych: mood and affect appropriate      Medications       ketorolac  30 mg Intravenous Once     lactated ringers  2,000 mL Intravenous Once       Data   Recent Labs   Lab 11/03/21  0458   WBC 9.2   HGB 16.5   MCV 96         POTASSIUM 3.5   CHLORIDE 102   CO2 29   BUN 9   CR 0.90   ANIONGAP 5   QUANG 9.4   *   ALBUMIN 4.4   PROTTOTAL 8.2   BILITOTAL 1.2   ALKPHOS 109   ALT 80*   AST 54*   LIPASE 27,786*       Imaging  No results found for this or any previous visit (from the past 24 hour(s)).

## 2021-11-03 NOTE — PLAN OF CARE
Summary: ETOH Pancreatitis   DATE & TIME: 11/3/21  1430  Cognitive Concerns/ Orientation : A & O  BEHAVIOR & AGGRESSION TOOL COLOR: Green  CIWA SCORE: Not ordered   ABNL VS/O2: BP elevated, HR mable in the 50's, otherwise VSS.  MOBILITY: Ind  PAIN MANAGMENT: Toradol x1 and dilaudid IV x1 for mid upper abd pain which radiates through to back, pt rated a 8 and describes as sharp on admission, now down to a 2 level currently.  DIET: NPO except ice chips  BOWEL/BLADDER: continent, no issues.  ABNL LAB/BG: ALT 80, AST 54, Lipase 27,786.  DRAIN/DEVICES: IV fluids infusing at 150cc/hr.  TELEMETRY RHYTHM: NA  SKIN: No issues  TESTS/PROCEDURES: Had CT which confirmed pancreatitis.  D/C DAY/GOALS/PLACE: Pending improvement of lab values, decreased pain and ability to tolerate diet.  OTHER IMPORTANT INFO: Last drink was 10/30, developed abd pain 10/31.

## 2021-11-03 NOTE — CONSULTS
"[facility] Consult & Liaison   11/3/2021  Lloyd Dillon 1995     [facility] Consult Diagnostic Assessment:    Started at: 2:32 pm  Completed at: 2:50 pm  Patient was assessed via virtually ([employer] cart or other teleconferencing device).  Duration of face to face time with patient in minutes: .25 hrs  CPT code(s) utilized: 43241 - Psychotherapy (with patient) - 30 (16-37*) min    Chief Complaint and History of Presenting Problem  Patient is a  26 year old year old  male who is currently at [facility] for alcohol related abdominal pain. Patient was referred for crisis consult today due to ongiong alcohol use and possible desire for medications. Patient reports drinking  14 days out of the month in October and that he last drank on the 30th. Patient reports alcohol use is often related to boredom and stress about work. He reports he doesn't have many coping skills to manage anxiety.     History of presenting problem: During Covid, March 2020-current has increased in drinking. Past month: drinking 2-3 days a week. He was drinking 4-5 beers- craft beer and \"cheap 3.2 beer\". No hard alcohol, wine. He has three previous hospitalization for physical health issues related to his alcohol use. Patient has no history of mental health concerns, hospitalizations, medications or commitment.       Demographics, Social, and Environmental Conditions   Patient lives alone and is their own legal guardian. Patient reports roommate recently moved out, knew he was concerned about his drinking and made him feel bad about it. Patient reports he recently impulsively told his landlord he was moving out. He has to be out on January 1st. He reports he felt like he needed a change of scenery, does not have a plan for what he will do next. Patient receives income from Employment: works in [employer], reports very stressful high stakes jobs, millions of dollars on the line should he mess up. However, does report it is supportive " job with unlimited vacation time. Patient is not currently a student. Patient is not a  member or . Patient identifies significant interpersonal relationships which include parents and a few friends.    Legal History  Patient has a history of or current involvement with the legal system (I.e probation, parole, arrests, incarceration): No. There is not a history of civil commitment.     Spiritual and Cultural Influences  Patient reports important Confucianism or spiritual beliefs which include none reported, of note patient did meet with  today and stated it was helpful. Patient reports important cultural influences which include none noted. .     Mental Health Symptoms and Substance Use  Patient reports no historical diagnoses. Reports primary symptoms of anxiety and desire to use alcohol. He reports he has a lot of anxiety and stress wondering about what ifs, he has sense of doom, experiences shame, and has irrational thoughts.     Current Symptoms and Mental Health History    Attention, Hyperactivity, and Impulsivity Symptoms      Patient reported symptoms related to hyperactivity, inattention, or impulsivity? No       Anxiety Symptoms    Patient reported anxiety symptoms? Yes: Generalized Symptoms: Avoidance, Cognitive anxiety - feelings of doom, racing thoughts, difficulty concentrating  and Excessive worry         Behavioral Difficulties    Patient reported behavioral difficulties? No       Mood Symptoms    Patient reported mood disorder symptoms? Yes: Crying or feels like crying, Feelings of helplessness , Feelings of hopelessness , Feelings of worthlessness , Impaired decision making , Isolative  and Low self esteem        Eating Disorders and Appetite Disturbance      Patient reported appetite symptoms? No     Sleep Disturbance    Patient reported difficulties with sleep? No        Interpersonal Functioning     Patient reported difficulties that may be associated with personality and  interpersonal functioning? No      Learning Disabilities/Cognitive/Developmental Disorders    Patient reported concerns related to learning disabilities, cognitive challenges, and/or developmental disorders? No       General Cognitive Impairments    Patient reported symptoms of cognitive impairments? No    Psychosis Symptoms    Patient reported symptoms of psychosis? No        Trauma and Post-Traumatic Stress Disorder    Physical Abuse: No   Emotional/Psychological Abuse: No  Sexual Abuse: No  Loss of a friend or family member to suicide: No  Other Traumatic Event: No     Patient reported trauma related symptoms? No       Substance Use  Is there a history of, or current, substance use? Patient reports frequent use of alcohol. Stating he uses over half the days of the week. He reports he drinks 4-5 beers at a time. Patient reports no history of treatment. Patient reports current withdrawal symptoms including tremors. No other use of concern.    Have you been to chemical dependency treatment or detox before? No     CAGE-AID    Have you felt you ought to cut down on your drinking or drug use? Yes     Have people annoyed you by criticizing your drinking or drug use? Yes   Have you felt bad or guilty about your drinking or drug use? Yes  Have you ever had a drink or used drugs first thing in the morning to steady your nerves or to get rid of a hangover? Yes   CAGE-AID Score: 4/4    Drug screen completed? No   BAL/Breathalyzer completed? No       History of Suicidal Ideation, Suicide Attempts, and Risk Formulation:   Suicide and Self-Harm    ESS-6  1.a. Over the past 2 weeks, have you had thoughts of killing yourself? No   1.b. Have you ever attempted to kill yourself and, if yes, when did this last happen? No  2. Recent or current suicide plan? No  3. Recent or current intent to act on ideation? No  4. Lifetime psychiatric hospitalization? No  5. Pattern of excessive substance use? Yes  6. Current irritability,  agitation, or aggression? No  ESS-6 Score: 1/6    SI: N/A  Plan: No  Intent: No   Prior Attempts: No     Is the patient engaged in self injurious behaviors? No     Risk to Others    Aggressive/Assaultive/Homicidal Risk Factors: No     Duty to Warn? No     Was a Child Protection Report Made? No       Was a Adult Protection Report Made? No        Sexually inappropriate behavior? No        Vulnerability to sexual exploitation? No     Current Providers  Primary Care Provider: No   Psychiatrist: No   Therapist: No   : No   ARMHS: No   ACT Team: No   Other: No    Current psychotropic medications? No  Medication Compliant? NA  Recent medication changes? NA  History of Commitment? No  History of Psychiatric Hospitalizations? No   History of programmatic care? No    Relevant Medical Concerns  Patient identifies concerns with completing ADLs? No  Patient can ambulate independently? Yes  Other medical health concerns? No  History of concussion or TBI? No     Family Mental Health History   Family History of Mental Health or Chemical Dependency Issues? Yes reports substance use runs in family    Mental Status Exam:  Affect: Appropriate  Appearance: Appropriate   Attention Span/Concentration: Attentive    Eye Contact: Engaged  Fund of Knowledge: Appropriate   Language /Speech Content: Fluent  Language /Speech Volume: Normal   Language /Speech Rate/Productions: Normal   Recent Memory: Intact  Remote Memory: Intact  Mood: Sad   Orientation:   Person: Yes   Place: Yes  Time of Day: Yes   Date: Yes   Situation (Do they understand why they are here?): yes  Psychomotor Behavior: Normal   Thought Content: Clear  Thought Form: Intact    Therapeutic Methodologies Utilized in Assessment    Psychotherapy techniques and/or interventions used: Establishing rapport, Active listening, Establish a discharge plan and Brief Supportive Therapy    Clinical Summary and Recommendation  Clinical summary:  Patient presents with alcohol  related abdominal pain. Patient reports he feels ready to quit after three recent hospitalization for use. Patient spoke with CD  earlier and agreed to enter IOP CD program. Upon talking with patient today, he is at low risk of harm to himself. Patient expressed desire to start therapy and outpatient programming prior to starting medications to see if they are needed.  agreed this appears like an appropriate first step, as patient has never engaged in therapy/treatment and could benefit from learning skills to manage anixety and urge to use.     Diagnosis:    Substance-Related & Addictive Disorders Alcohol Use Disorder   303.90 (F10.20) Moderate  , by history;    300.02 (F41.1) Generalized Anxiety Disorder,      Recommendation/Plan:   1. Patient to attend outpatient [facility] tx. Patient does not require hold or inpatient treatment at this time. Patient desires to stop use, commitment also not considered.   2. Patient open to therapy appointment:   Date: Monday, 11/8/2021  Time: 1:00 pm - 1:50 pm  Provider: Danielle Ibrahim MA  [facility]  Location: [facility], [address] [place] [address], [address] [place] [address]  Phone: [phone]  Type: Teletherapy  3. Re-consult psychiatry as needed.      Angie Goldberg, [facility]  [facility], Consult and Liaison  [phone]

## 2021-11-03 NOTE — ED PROVIDER NOTES
"  History     Chief Complaint:  Abdominal Pain      HPI   Lloyd Dillon is a 26 year old male who presents with abdominal pain which started Sunday and dry heaving which started this morning. He reports that he was drinking on Saturday. He endorses back pain that radiates up to his mid back. He reports that he had been drinking 14 days out of the month in October and that he last drank on the 30th. The last time he felt similar pain was in august when he had pancreatitis. He denies previous surgeries, diarrhea, fever, difficulty urinating, difficulty breathing, and cough. He reports taking Prilosec at 1530 yesterday.    Review of Systems   Constitutional: Negative for fever.   Respiratory: Negative for cough and shortness of breath.    Gastrointestinal: Positive for abdominal pain and nausea. Negative for diarrhea.   Genitourinary: Negative for dysuria.   Musculoskeletal: Positive for back pain.   All other systems reviewed and are negative.    Allergies:  No known drug allergies     Medications:    The patient is not currently taking any prescribed medications.    Past Medical History:    Pancreatitis  Asthma     Social History:  Patient was unaccompanied  History of alcohol use    Physical Exam     Patient Vitals for the past 24 hrs:   BP Temp Temp src Pulse Resp SpO2 Height Weight   11/03/21 0451 (!) 153/91 98.2  F (36.8  C) Oral 96 20 100 % 1.753 m (5' 9\") 77.1 kg (170 lb)       Physical Exam  General: Sitting up in bed  Eyes:  The pupils are equal and round    Conjunctivae and sclerae are normal  ENT:    Wearing a mask  Neck:  Normal range of motion  CV:  Regular rate, regular rhythm     Skin warm and well perfused   Resp:  Non labored breathing on room air    No tachypnea    No cough heard    Lungs clear bilaterally  GI:  Abdomen is soft, there is no rigidity    No distension    No rebound tenderness     Mild epigastric tenderness  MS:  Normal muscular tone  Skin:  No rash or acute skin lesions noted  Neuro:  "  Awake, alert.      Speech is normal and fluent.    Face is symmetric.     Moves all extremities equally  Psych: Normal affect.  Appropriate interactions.    Emergency Department Course     Laboratory:  BMP: glucose 135 (H) o/w WNL (Creatinine 0.9)    CBC: WBC 9.2, HGB 16.5,     Lipase 02836 (H)  Hepatic panel: Bili Direct 0.3 (H), Bili Total 1.2, Albumin 4.4, Protein Total 8.2, Alkphos 109, ALT 80 (H), AST 54 (H).    Emergency Department Course:    Reviewed:  I reviewed nursing notes, vitals, past history and care everywhere    Assessments:  0511 I obtained history and examined the patient as noted above.   0609 I rechecked the patient and explained findings.     Consult:  0618 I spoke with Dr. Anna of the Hospitalist service from [facility] regarding patient's presentation, findings, and plan of care.      Interventions:  0517 Sodium chloride bolus, 1000 ml, IV     Disposition:  The patient was discharged to home.    Impression & Plan        Medical Decision Making:  Lloyd Dillon is a 26-year-old male who presented to the emergency department with abdominal pain.  Patient thinks he has pancreatitis again.  He was admitted in [place] in August for similar pain.  Reviewed the records from that visit on his phone.  His lipase was about 7000.  CT abdomen and ultrasound abdomen done at that time that showed no gallstones and the CT scan just showed inflammation/fluid suggestive of pancreatitis. Lipase significantly elevated today suggestive of pancreatitis. Doubt gallstones. Doubt complication from pancreatitis. Given IV fluids and pain medication in ED. Do not think imaging of abdomen is indicated today. Will admit and discussed with hospitalist.     Diagnosis:    ICD-10-CM    1. Alcohol-induced acute pancreatitis, unspecified complication status  K85.20      Scribe Disclosure:  Zen KELLY, am serving as a scribe at 5:03 AM on 11/3/2021 to document services personally performed by Demarco  Ingrid Berger, based on my observations and the provider's statements to me.      Ingrid Pal MD  11/03/21 0659

## 2021-11-03 NOTE — PROGRESS NOTES
"Type Of Assessment: Inpatient Substance Use Comprehensive Assessment    Referral Source:  [facility]  MRN: [id number]    DATE OF SERVICE: November 3, 2021  Date of previous RENEE Assessment: NA  Patient confirmed identity through two factor verification:  and SSN    PATIENT'S NAME: Lloyd Dillon  Age: 26 year old  Last 4 SSN: [id number]  Sex: male   Gender Identity: male  Sexual Orientation: Heterosexual  Cultural Background: No, Denies any cultural influences or concerns that need to be considered for treatment  YOB: 1995  Current Address:   [address] [place], [place] [address]  [facility] [address]  Patient Phone Number:  [phone]   Patient's E-Mail Contact:  [email or web address]  Funding: [employer]  PMI: NA  Emergency Contact: Annamaria Dillon ([facility]) [phone]  DAANES information was provided to patient and patient does not want a copy.     Telemedicine Visit: The patient's condition can be safely assessed and treated via synchronous audio and visual telemedicine encounter.    Reason for Telemedicine Visit: Services only offered telehealth  Originating Site (Patient Location): [facility] [address] [place] [address]   Distant Site (Provider Location): [facility] Outpatient Setting: [facility]  Consent:  The patient/guardian has verbally consented to: the potential risks and benefits of telemedicine (video visit) versus in person care; bill my insurance or make self-payment for services provided; and responsibility for payment of non-covered services.   Mode of Communication:  telephone    START TIME: 1:34pm  END TIME: 2:00pm    As the provider I attest to compliance with applicable laws and regulations related to telemedicine.   Lloyd Dillon was seen for a substance use disorder consult on 11/3/2021 by JESUS Rodas.    Reason for Substance Use Disorder Consult:  \"I am sick of ending up in the hospital over something that I feel I can control, but " "it seems random and I don't understand.\" He reports he is miserable with his physical health.    Chief Complaint:  Abdominal Pain      HPI   Lloyd Dillon is a 26 year old male who presents with abdominal pain which started Sunday and dry heaving which started this morning. He reports that he was drinking on Saturday. He endorses back pain that radiates up to his mid back. He reports that he had been drinking 14 days out of the month in October and that he last drank on the 30th. The last time he felt similar pain was in august when he had pancreatitis. He denies previous surgeries, diarrhea, fever, difficulty urinating, difficulty breathing, and cough. He reports taking Prilosec at 1530 yesterday.    Are you currently having severe withdrawal symptoms that are putting yourself or others in danger? No  Are you currently having severe medical problems that require immediate attention? No  Are you currently having severe emotional or behavioral problems that are putting yourself or others at risk of harm? No    Have you participated in prior substance use disorder evaluations? No   Have you ever been to detox, inpatient or outpatient treatment for substance related use? List previous treatment: No   Have you ever had a gambling problem or had treatment for compulsive gambling? No  Patient does not appear to be in severe withdrawal, an imminent safety risk to self or others, or requiring immediate medical attention and may proceed with the assessment interview.    Comprehensive Substance Use History   X X = Primary Drug Used Age of First Use    Pattern of Substance Use   (heaviest use in life and a use history within the past year if applicable) (DSM-5: Sx #3) Date /  Quantity of last use if within the past 30 days Withdrawal Potential?   Method of use  (Oral, smoked, snorted, IV, etc)    Alcohol   18 HU: During Covid, March 2020-current.  Past month: drinking 2-3 days a week. He was drinking 4-5 beers- craft beer and " "\"cheap 3.2 beer\". No hard alcohol, wine.  Hard seltzers he will drink with friends, he would drink it.   10/31/21  2 drinks no oral    Marijuana/Hashish   No use        Cocaine/Crack No use        Meth/Amphetamines   No use        Heroin   No use        Other Opiates/Synthetics   No use        Inhalants  No use        Benzodiazepines   No use        Hallucinogens   No use        Barbiturates/Sedatives/Hypnotics   No use        Over-the-Counter Drugs   No use        Other   No use        Nicotine   No use         What are your recovery goals? \"I want to kill it completely, be done with it. There is no point. Alcoholism runs quite deep in my family.\"    Withdrawal symptoms: Have you had any of the following withdrawal symptoms?    Shaky / Jittery / Tremors    How has your tolerance changed? \"it has become less.\"    In the past 30 days, on a scale of 0-10 (0 least severe to 10 being most severe), how would you rate your cravings? 5    Who is concerned about your substance use? \"my parents.\"    How much time do you spend drinking/recovering from alcohol/drugs? After work and he would have his list drink at 8pm before he goes to bed.    How often do you drink/use more than you intended to or over a longer time than you intended to? \"maybe 60% of the time.\"    What activities were given up or reduced because of your drinking/drug use? \"definitely social life, going to the gym.\"    Have you had periods of abstinence?  Yes  What was your longest period? 3 months  How: \"I just said I am done.\"  Any circumstances that lead to relapse? \"my roommate moved out and made me feel like I was a bad roommate. He knew I had a problem but we never talked about it.\"    How have you tried to control or cut down on your drinking/using? \"stopped carrying credit cards. I don't carry cash. I try to stay busy as possible. Stay at work longer or stay away from home. The busier I am the easier it is to say no.\"    What activities have you " "engaged in when using alcohol/other drugs that could be hazardous to you or others? (i.e. driving a car/motorcycle/boat, operating machinery, unsafe sex, sharing needles for drugs or tattoos, etc ) The patient denied engaging in any of the above dangerous activities when using alcohol and/or drugs.    A description of any risk-taking behavior, including behavior that puts the client at risk of exposure to blood-borne or sexually transmitted diseases: none reported    Arrests and legal interventions related to substance use: none reported    A description of how the patient's use affected their ability to function appropriately in a work setting: \"just hungover. It will usually wear off around 10 o'clock.\"    A description of how the patient's use affected their ability to function appropriately in an educational setting: none reported    A description of how the patient's use affected their ability to function appropriately at home: it has.    Leisure time activities that are associated with substance use: at home on a screen.    When did you first think you had a problem with drugs or alcohol? \"I'd say right around mid-covid and I started looking at my bank balance.\"      MEDICAL HISTORY  Physical or medical concerns or diagnoses:   Patient Active Problem List   Diagnosis     Acute pancreatitis, unspecified complication status, unspecified pancreatitis type     Alcohol-induced acute pancreatitis, unspecified complication status     Do you have any current medical treatment needs not being addressed by inpatient treatment?  no    Do you need a referral for a medical provider? no    Current medications:   Current Facility-Administered Medications   Medication     dextrose 5% in lactated ringers + KCl 20 mEq/L infusion     folic acid (FOLVITE) tablet 1 mg     HYDROmorphone (DILAUDID) injection 0.2 mg     [START ON 11/4/2021] influenza quadrivalent (PF) vacc (FLUZONE) injection 0.5 mL     lactated ringers BOLUS 2,000 " mL     lidocaine (LMX4) cream     lidocaine 1 % 0.1-1 mL     melatonin tablet 3 mg     naloxone (NARCAN) injection 0.2 mg    Or     naloxone (NARCAN) injection 0.4 mg    Or     naloxone (NARCAN) injection 0.2 mg    Or     naloxone (NARCAN) injection 0.4 mg     ondansetron (ZOFRAN-ODT) ODT tab 4 mg    Or     ondansetron (ZOFRAN) injection 4 mg     oxyCODONE (ROXICODONE) tablet 5 mg     pantoprazole (PROTONIX) IV push injection 40 mg     prochlorperazine (COMPAZINE) injection 10 mg    Or     prochlorperazine (COMPAZINE) tablet 10 mg    Or     prochlorperazine (COMPAZINE) suppository 25 mg     senna-docusate (SENOKOT-S/PERICOLACE) 8.6-50 MG per tablet 1 tablet    Or     senna-docusate (SENOKOT-S/PERICOLACE) 8.6-50 MG per tablet 2 tablet     sodium chloride (PF) 0.9% PF flush 3 mL     sodium chloride (PF) 0.9% PF flush 3 mL     thiamine (B-1) tablet 100 mg       Are you pregnant? NA, Male    Do you have any specific physical needs/accommodations? No    MENTAL HEALTH HISTORY:  Have you ever had  hospitalizations or treatment for mental health illness: No    Mental health history, including diagnosis and symptoms, and the effect on the client's ability to function: none reported    Current mental health treatment including psychotropic medication needed to maintain stability: (Note: The assessment must utilize screening tools approved by the commissioner pursuant to section 245.4863 to identify whether the client screens positive for co-occurring disorders): none reported    GAIN-SS Tool:  When was the last time that you had significant problems... 11/3/2021   with feeling very trapped, lonely, sad, blue, depressed or hopeless about the future? 2 to 12 months ago   with sleep trouble, such as bad dreams, sleeping restlessly, or falling asleep during the day? Past Month   with feeling very anxious, nervous, tense, scared, panicked or like something bad was going to happen? 2 to 12 months ago   with becoming very  "distressed & upset when something reminded you of the past? 2 to 12 months ago   with thinking about ending your life or committing suicide? Never     When was the last time that you did the following things 2 or more times? 11/3/2021   Lied or conned to get things you wanted or to avoid having to do something? 2 to 12 months ago   Had a hard time paying attention at school, work or home? 2 to 12 months ago   Had a hard time listening to instructions at school, work or home? 2 to 12 months ago   Were a bully or threatened other people? Never   Started physical fights with other people? Never       Have you ever been verbally, emotionally, physically or sexually abused?   No    Family history of substance use and misuse: 2 uncles, great grandfather.    The patient's desire for family involvement in the treatment program: \"to a certain extent I want my parents involved.\"  Level of family support: \"it's good.\"    Social network in relation to expected support for recovery: he has friends who do not use alcohol.    Are you currently in a significant relationship? No    Do you have any children (include living arrangements/custody/contact)?:  no    What is your current living situation? \"I live alone.\"    Are you employed/attending school? Yes, full time.    SUMMARY:  Ability to understand written treatment materials: Yes  Ability to understand patient rules and patient rights: Yes  Does the patient recognize needs related to substance use and is willing to follow treatment recommendations: Yes  Does the patient have an opioid use disorder:  does not have a history of opiate use.    ASAM Dimension Scale Ratings:  Dimension 1: 0 Client displays full functioning with good ability to tolerate and cope with withdrawal discomfort. No signs or symptoms of intoxication or withdrawal or resolving signs or symptoms.  Dimension 2: 1 Client tolerates and saida with physical discomfort and is able to get the services that the client " needs.  Dimension 3: 2 Client has difficulty with impulse control and lacks coping skills. Client has thoughts of suicide or harm to others without means; however, the thoughts may interfere with participation in some treatment activities. Client has difficulty functioning in significant life areas. Client has moderate symptoms of emotional, behavioral, or cognitive problems. Client is able to participate in most treatment activities.  Dimension 4: 2 Client displays verbal compliance, but lacks consistent behaviors; has low motivation for change; and is passively involved in treatment.  Dimension 5: 3 Client has poor recognition and understanding of relapse and recidivism issues and displays moderately high vulnerability for further substance use or mental health problems. Client has few coping skills and rarely applies coping skills.  Dimension 6: 2 Client is engaged in structured, meaningful activity, but peers, family, significant other, and living environment are unsupportive, or there is criminal justice involvement by the client or among the client's peers, significant others, or in the client's living environment.    Category of Substance Severity (ICD-10 Code / DSM 5 Code)     Alcohol Use Disorder Severe  (10.20) (303.90)   Cannabis Use Disorder The patient does not meet the criteria for a Cannabis use disorder.   Hallucinogen Use Disorder The patient does not meet the criteria for a Hallucinogen use disorder.   Inhalant Use Disorder The patient does not meet the criteria for an Inhalant use disorder.   Opioid Use Disorder The patient does not meet the criteria for an Opioid use disorder.   Sedative, Hypnotic, or Anxiolytic Use Disorder The patient does not meet the criteria for a Sedative/Hypnotic use disorder.   Stimulant Related Disorder The patient does not meet the criteria for a Stimulant use disorder.   Tobacco Use Disorder The patient does not meet the criteria for a Tobacco use disorder.   Other (or  unknown) Substance Use Disorder The patient does not meet the criteria for a Other (or unknown) Substance use disorder.     A problematic pattern of alcohol/drug use leading to clinically significant impairment or distress, as manifested by at least two of the following, occurring within a 12-month period:    1.) Alcohol/drug is often taken in larger amounts or over a longer period than was intended.  2.) There is a persistent desire or unsuccessful efforts to cut down or control alcohol/drug use  3.) A great deal of time is spent in activities necessary to obtain alcohol, use alcohol, or recover from its effects.  4.) Craving, or a strong desire or urge to use alcohol/drug  5.) Recurrent alcohol/drug use resulting in a failure to fulfill major role obligations at work, school or home.  6.) Continued alcohol use despite having persistent or recurrent social or interpersonal problems caused or exacerbated by the effects of alcohol/drug.  7.) Important social, occupational, or recreational activities are given up or reduced because of alcohol/drug use.  9.) Alcohol/drug use is continued despite knowledge of having a persistent or recurrent physical or psychological problem that is likely to have been caused or exacerbated by alcohol.  10.) Tolerance, as defined by either of the following: A need for markedly increased amounts of alcohol/drug to achieve intoxication or desired effect.  11.) Withdrawal, as manifested by either of the following: The characteristic withdrawal syndrome for alcohol/drug (refer to Criteria A and B of the criteria set for alcohol/drug withdrawal).    Specify if: In early remission:  After full criteria for alcohol/drug use disorder were previously met, none of the criteria for alcohol/drug use disorder have been met for at least 3 months but for less than 12 months (with the exception that Criterion A4,  Craving or a strong desire or urge to use alcohol/drug  may be met).     In sustained  remission:   After full criteria for alcohol use disorder were previously met, non of the criteria for alcohol/drug use disorder have been met at any time during a period of 12 months or longer (with the exception that Criterion A4,  Craving or strong desire or urge to use alcohol/drug  may be met).     Specify if:   This additional specifier is used if the individual is in an environment where access to alcohol is restricted.    Mild: Presence of 2-3 symptoms  Moderate: Presence of 4-5 symptoms  Severe: Presence of 6 or more symptoms    Collateral information: [facility] EMR:  The patient's medical record at [facility] was reviewed and the information contained in the medical record supported the patient's account of his chemical use history and chemical use consequences.    Recommendations:   1)  Complete an Intensive Outpatient CD Treatment Program.  2)  Abstain from all mood-altering chemicals unless prescribed by a licensed provider.   3)  Attend, at minimum, 2 weekly support group meetings, such as 12 step based (AA/NA), SMART Recovery, Health Realizations, and/or Refuge Recovery meetings.     4)  Actively work with a male mentor/sponsor on a weekly basis.   5)  Follow all the recommendations of your treatment/medical providers.    Referrals:  [facility]: [email or web address]  [facility]: [email or web address]  [facility]: [email or web address]  [facility]: [email or web address]     RENEE consult completed by: JESUS Rodas.  Phone Number: [phone]  E-mail Address: [email or web address] Addiction [facility] Evaluation Department  [address] [place] [address]     *Due to regulation of Title 42 of the Code of Federal Regulations (CFR) Part 2:  Confidentiality laws apply to this note and the information wherein.  Thus, this note cannot be copy and pasted into any other health care staff's note nor can it be included in general medical records sent to ANY outside agency without the patient's written consent.

## 2021-11-03 NOTE — CONSULTS
11/3/2021  Pt completed his RENEE CA today. He is willing to attend IOP tx. I emailed him the following tx options to look into and to let me know which program he would like to attend.    Recommendations:   1)  Complete an Intensive Outpatient CD Treatment Program.  2)  Abstain from all mood-altering chemicals unless prescribed by a licensed provider.   3)  Attend, at minimum, 2 weekly support group meetings, such as 12 step based (AA/NA), SMART Recovery, Health Realizations, and/or Refuge Recovery meetings.     4)  Actively work with a male mentor/sponsor on a weekly basis.   5)  Follow all the recommendations of your treatment/medical providers.    Referrals:  [facility]: [email or web address]  [facility]: [email or web address]  [facility]: [email or web address]/  [facility]: [email or web address]      DAANES Assessment ID: [id number]

## 2021-11-03 NOTE — PROGRESS NOTES
RECEIVING UNIT ED HANDOFF REVIEW    ED Nurse Handoff Report was reviewed by: Gabi Brown RN on November 3, 2021 at 8:25 AM

## 2021-11-03 NOTE — ED NOTES
"[facility]  ED Nurse Handoff Report    ED Chief complaint: Abdominal Pain (Started Sunday. Vomiting started this AM. Hx of pancreatitis. Was drinking Saturday.)      ED Diagnosis:   Final diagnoses:   Alcohol-induced acute pancreatitis, unspecified complication status       Code Status: Full Code    Allergies: No Known Allergies    Patient Story: Hx of pancreatitis and alcoholism.  He states he has an appt with a specialist on the 11th to help with his drinking.  Lipase, and liver enzymes elevated.  Morphine, zofran, and fluids given.  Calm and cooperative.  Focused Assessment:  See above    Treatments and/or interventions provided: see above  Patient's response to treatments and/or interventions: Resting in bed    To be done/followed up on inpatient unit:  Pain meds and VS    Does this patient have any cognitive concerns?: A/O x4    Activity level - Baseline/Home:  Independent  Activity Level - Current:   Independent    Patient's Preferred language: English   Needed?: No    Isolation: None  Infection: Not Applicable  Patient tested for COVID 19 prior to admission: YES  Bariatric?: No    Vital Signs:   Vitals:    11/03/21 0451   BP: (!) 153/91   Pulse: 96   Resp: 20   Temp: 98.2  F (36.8  C)   TempSrc: Oral   SpO2: 100%   Weight: 77.1 kg (170 lb)   Height: 1.753 m (5' 9\")       Cardiac Rhythm:     Was the PSS-3 completed:   Yes  What interventions are required if any?               Family Comments: none  OBS brochure/video discussed/provided to patient/family: Yes              Name of person given brochure if not patient: na              Relationship to patient: na    For the majority of the shift this patient's behavior was Green.   Behavioral interventions performed were encouragement.    ED NURSE PHONE NUMBER: ER         "

## 2021-11-03 NOTE — PHARMACY-ADMISSION MEDICATION HISTORY
Pharmacy Medication History  Admission medication history interview status for the 11/3/2021  admission is complete. See EPIC admission navigator for prior to admission medications     Location of Interview: Patient room  Medication history sources: Patient    Significant changes made to the medication list:  Added vitamins.    In the past week, patient estimated taking medication this percent of the time: greater than 90%    Additional medication history information:   None    Medication reconciliation completed by provider prior to medication history? No    Time spent in this activity: 10 min    Prior to Admission medications    Medication Sig Last Dose Taking? Auth Provider   Cyanocobalamin (B-12 PO) Take 1 tablet by mouth daily 11/2/2021 at am Yes Unknown, Entered By History   folic acid (FOLVITE) 1 MG tablet Take 1 tablet (1 mg) by mouth daily 11/2/2021 at am Yes Shoshana Duong MD   multivitamin w/minerals (THERA-VIT-M) tablet Take 1 tablet by mouth daily 11/2/2021 at am Yes Unknown, Entered By History   omeprazole (PRILOSEC) 20 MG DR capsule Take 20 mg by mouth daily 11/3/2021 at 0330 Yes Unknown, Entered By History   thiamine (B-1) 100 MG tablet Take 1 tablet (100 mg) by mouth daily 11/2/2021 at am Yes Shoshana Duong MD   zinc gluconate 50 MG tablet Take 50 mg by mouth daily 11/2/2021 at am Yes Unknown, Entered By History       The information provided in this note is only as accurate as the sources available at the time of update(s)

## 2021-11-03 NOTE — CONSULTS
"[facility] Consultation     Lloyd Dillon  [address]  [facility] [address]  26 year old male     Admission Date/Time: 11/3/2021  Primary Care Provider: No Ref-Primary, Physician  Referring / Attending Physician:  Aspen     We were asked to see the patient in consultation by Dr. Louise for evaluation of recurrent pancreatitis.    ASSESSMENT:    Acute, recurrent alcoholic pancreatitis  Mild hepatitis, alcohol induced  Hepatomegaly on CT  Alcohol use disorder    RECOMMENDATIONS:  -Alcohol abstinence permanently  -Chem dep  -Withdrawal precautions  -Aggressive IV fluid boluses with lactated Ringer's.  I estimate the patient will require 5 - 6 L/next 24 hrs, given as boluses, not MIVF.    Another 2L bolus ordered for 1600.  -Follow LFTs, BUN daily  -Agree with PPI for duodenal prophylaxis  -Narcotic pain control prn - transition to po asap.  Stopped toradol.  -Trial of clears, ADAT assuming diet does not precipitate need for additional narcotics    Thank you for involving us in this patient's care.  Please call me with any questions.    Eber Mike DO   [facility]  Cell [phone]    ________________________________________________________________________        CC: abd pain     HPI:  Lloyd Dillon is a 26 year old male who we are asked to see for diagnosis of pancreatitis.  This appears to be his third episode of acute, alcohol induced pancreatitis.  He seems to minimize his alcohol use, stating that he only drinks maybe 2 or 3 beers, 4 days/week.  In the past, he admits \" to drinking more heavily,\" but does not give specifics when asked directly.    He recalls his current abdominal pain starting on Sunday.  This was in the epigastrium with radiation to the back, a sharp, familiar pain from prior episodes of pancreatitis though he states \"not as severe.\"  He did have some nausea this morning, but no vomiting.  He states his urine is clear, receiving IV fluids.  Denies other drug use. "  Denies other legal or social complications of alcohol use.     ROS: A comprehensive ten point review of systems was negative aside from those in mentioned in the HPI.      PAST MEDICAL HISTORY:  Patient Active Problem List    Diagnosis Date Noted     Alcohol-induced acute pancreatitis, unspecified complication status 11/03/2021     Priority: Medium     Acute pancreatitis, unspecified complication status, unspecified pancreatitis type 01/09/2021     Priority: Medium     SOCIAL HISTORY:  Social History     Tobacco Use     Smoking status: Never Smoker     Smokeless tobacco: Never Used   Substance Use Topics     Alcohol use: Yes     Comment: social     Drug use: None     FAMILY HISTORY:  No family history on file.  ALLERGIES: No Known Allergies  MEDICATIONS:   Current Facility-Administered Medications   Medication     dextrose 5% in lactated ringers + KCl 20 mEq/L infusion     folic acid (FOLVITE) tablet 1 mg     HYDROmorphone (DILAUDID) injection 0.2 mg     [START ON 11/4/2021] influenza quadrivalent (PF) vacc (FLUZONE) injection 0.5 mL     ketorolac (TORADOL) injection 30 mg     [COMPLETED] lactated ringers BOLUS 2,000 mL     lidocaine (LMX4) cream     lidocaine 1 % 0.1-1 mL     melatonin tablet 3 mg     naloxone (NARCAN) injection 0.2 mg    Or     naloxone (NARCAN) injection 0.4 mg    Or     naloxone (NARCAN) injection 0.2 mg    Or     naloxone (NARCAN) injection 0.4 mg     ondansetron (ZOFRAN-ODT) ODT tab 4 mg    Or     ondansetron (ZOFRAN) injection 4 mg     oxyCODONE (ROXICODONE) tablet 5 mg     pantoprazole (PROTONIX) IV push injection 40 mg     prochlorperazine (COMPAZINE) injection 10 mg    Or     prochlorperazine (COMPAZINE) tablet 10 mg    Or     prochlorperazine (COMPAZINE) suppository 25 mg     senna-docusate (SENOKOT-S/PERICOLACE) 8.6-50 MG per tablet 1 tablet    Or     senna-docusate (SENOKOT-S/PERICOLACE) 8.6-50 MG per tablet 2 tablet     sodium chloride (PF) 0.9% PF flush 3 mL     sodium chloride (PF)  "0.9% PF flush 3 mL     thiamine (B-1) tablet 100 mg     PHYSICAL EXAM:   BP (!) 155/104 (BP Location: Right arm)   Pulse 57   Temp 97.9  F (36.6  C) (Oral)   Resp 18   Ht 1.753 m (5' 9\")   Wt 77.1 kg (170 lb)   SpO2 96%   BMI 25.10 kg/m     GEN: Alert, oriented x3, communicative and in NAD.  KORY: AT, anicteric, OP without erythema, exudate, or ulcers.    NECK: Supple.    LYMPH: No LAD noted.  HRT: RRR  LUNGS: CTA  ABD: ND, +BS, pain with mild palpation, no rebound  SKIN: No rash, jaundice or spider angiomata  MSKL: LE free of edema, strength 5/5 all 4 extrems  NEURO: CN grossly intact, sensation intact to light touch, toes downgoing.     ADDITIONAL DATA:   I reviewed the patient's new clinical lab test results.   Recent Labs   Lab Test 11/03/21 0458 01/12/21  0854 01/11/21  1102   WBC 9.2 9.0 10.0   HGB 16.5 13.4 14.0   MCV 96 98 98    175 167     Recent Labs   Lab Test 11/03/21 0458 01/12/21  0854 01/11/21  1102   POTASSIUM 3.5 3.4 3.7   CHLORIDE 102 104 102   CO2 29 30 29   BUN 9 5* 5*   ANIONGAP 5 4 5     Recent Labs   Lab Test 11/03/21  0458 01/12/21  0854 01/11/21  1102 01/10/21  1140 01/09/21  1948   ALBUMIN 4.4 3.3* 3.4   < >  --    BILITOTAL 1.2 1.3 1.1   < >  --    ALT 80* 58 67   < >  --    AST 54* 28 38   < >  --    PROTEIN  --   --   --   --  Negative   LIPASE 27,786* 1,091* 3,367*   < >  --     < > = values in this interval not displayed.        I reviewed the patient's new imaging results.               "

## 2021-11-03 NOTE — PROGRESS NOTES
"[facility] Progress Note  [facility]    SH Request.    Ptnt Lloyd shared thoughts about realizing he \"needs to get sober,\" that this is the third time he's been in hospital with pancreatitis because of his drinking, and that he \"does stupid stuff.\"    He told of his family and community all being within a conservative independent [employer], that he feels judged and shamed, and his struggles with questioning the teachings he grew up with, such as: \"just pray it away\" to resolve issues; God will solve things \"better than medicine;\" \"the Bible is all you need.\" He wondered who is God and who he can discuss deep questions with in his social [place].    Lloyd named drinking to \"escape his reality,\" seeking an outpatient addiction program, awareness of comparing himself to others and being a middle child, worry that he hasn't yet told his parents he's in hospital right now, and loneliness as things he's holding.    We explored ideas of holding questions loosely, self-compassion, discovering personal identity, beliefs and values, and honest examination of motivations and addiction triggers.    Visit ended when Lloyd took a phone call.    I offered reflective conversation and emotional support, validated experiences, and encouraged contemplation.     remains available.    Melanie Jerez  Associate   "

## 2021-11-04 VITALS
TEMPERATURE: 99.2 F | WEIGHT: 170 LBS | DIASTOLIC BLOOD PRESSURE: 88 MMHG | BODY MASS INDEX: 25.18 KG/M2 | SYSTOLIC BLOOD PRESSURE: 130 MMHG | OXYGEN SATURATION: 100 % | RESPIRATION RATE: 18 BRPM | HEIGHT: 69 IN | HEART RATE: 86 BPM

## 2021-11-04 LAB
ALBUMIN SERPL-MCNC: 3.7 G/DL (ref 3.4–5)
ALP SERPL-CCNC: 78 U/L (ref 40–150)
ALT SERPL W P-5'-P-CCNC: 61 U/L (ref 0–70)
ANION GAP SERPL CALCULATED.3IONS-SCNC: 4 MMOL/L (ref 3–14)
AST SERPL W P-5'-P-CCNC: 33 U/L (ref 0–45)
BILIRUB SERPL-MCNC: 1.1 MG/DL (ref 0.2–1.3)
BUN SERPL-MCNC: 3 MG/DL (ref 7–30)
CALCIUM SERPL-MCNC: 9.4 MG/DL (ref 8.5–10.1)
CHLORIDE BLD-SCNC: 102 MMOL/L (ref 94–109)
CO2 SERPL-SCNC: 30 MMOL/L (ref 20–32)
CREAT SERPL-MCNC: 0.7 MG/DL (ref 0.66–1.25)
GFR SERPL CREATININE-BSD FRML MDRD: >90 ML/MIN/1.73M2
GLUCOSE BLD-MCNC: 116 MG/DL (ref 70–99)
LIPASE SERPL-CCNC: 9002 U/L (ref 73–393)
POTASSIUM BLD-SCNC: 3.5 MMOL/L (ref 3.4–5.3)
PROT SERPL-MCNC: 6.9 G/DL (ref 6.8–8.8)
SODIUM SERPL-SCNC: 136 MMOL/L (ref 133–144)

## 2021-11-04 PROCEDURE — 96376 TX/PRO/DX INJ SAME DRUG ADON: CPT

## 2021-11-04 PROCEDURE — 250N000013 HC RX MED GY IP 250 OP 250 PS 637: Performed by: INTERNAL MEDICINE

## 2021-11-04 PROCEDURE — G0378 HOSPITAL OBSERVATION PER HR: HCPCS

## 2021-11-04 PROCEDURE — 250N000011 HC RX IP 250 OP 636: Performed by: HOSPITALIST

## 2021-11-04 PROCEDURE — 83690 ASSAY OF LIPASE: CPT | Performed by: HOSPITALIST

## 2021-11-04 PROCEDURE — 99207 PR CDG-CODE CATEGORY CHANGED: CPT | Performed by: HOSPITALIST

## 2021-11-04 PROCEDURE — 96375 TX/PRO/DX INJ NEW DRUG ADDON: CPT

## 2021-11-04 PROCEDURE — 80053 COMPREHEN METABOLIC PANEL: CPT | Performed by: INTERNAL MEDICINE

## 2021-11-04 PROCEDURE — 36415 COLL VENOUS BLD VENIPUNCTURE: CPT | Performed by: INTERNAL MEDICINE

## 2021-11-04 PROCEDURE — 258N000001 HC RX 258: Performed by: INTERNAL MEDICINE

## 2021-11-04 PROCEDURE — 90686 IIV4 VACC NO PRSV 0.5 ML IM: CPT | Performed by: HOSPITALIST

## 2021-11-04 PROCEDURE — G0008 ADMIN INFLUENZA VIRUS VAC: HCPCS | Performed by: HOSPITALIST

## 2021-11-04 PROCEDURE — 99217 PR OBSERVATION CARE DISCHARGE: CPT | Performed by: HOSPITALIST

## 2021-11-04 PROCEDURE — C9113 INJ PANTOPRAZOLE SODIUM, VIA: HCPCS | Performed by: HOSPITALIST

## 2021-11-04 RX ADMIN — DEXTROSE MONOHYDRATE, SODIUM CHLORIDE, SODIUM LACTATE, POTASSIUM CHLORIDE, CALCIUM CHLORIDE: 5; 600; 310; 179; 20 INJECTION, SOLUTION INTRAVENOUS at 09:13

## 2021-11-04 RX ADMIN — OXYCODONE HYDROCHLORIDE 5 MG: 5 TABLET ORAL at 00:55

## 2021-11-04 RX ADMIN — FOLIC ACID 1 MG: 1 TABLET ORAL at 08:27

## 2021-11-04 RX ADMIN — DEXTROSE MONOHYDRATE, SODIUM CHLORIDE, SODIUM LACTATE, POTASSIUM CHLORIDE, CALCIUM CHLORIDE: 5; 600; 310; 179; 20 INJECTION, SOLUTION INTRAVENOUS at 02:36

## 2021-11-04 RX ADMIN — PANTOPRAZOLE SODIUM 40 MG: 40 INJECTION, POWDER, FOR SOLUTION INTRAVENOUS at 08:27

## 2021-11-04 RX ADMIN — THIAMINE HCL TAB 100 MG 100 MG: 100 TAB at 08:27

## 2021-11-04 RX ADMIN — INFLUENZA A VIRUS A/VICTORIA/2570/2019 IVR-215 (H1N1) ANTIGEN (FORMALDEHYDE INACTIVATED), INFLUENZA A VIRUS A/TASMANIA/503/2020 IVR-221 (H3N2) ANTIGEN (FORMALDEHYDE INACTIVATED), INFLUENZA B VIRUS B/PHUKET/3073/2013 ANTIGEN (FORMALDEHYDE INACTIVATED), AND INFLUENZA B VIRUS B/WASHINGTON/02/2019 ANTIGEN (FORMALDEHYDE INACTIVATED) 0.5 ML: 15; 15; 15; 15 INJECTION, SUSPENSION INTRAMUSCULAR at 09:44

## 2021-11-04 NOTE — PLAN OF CARE
Observation goals PRIOR TO DISCHARGE     Comments: -diagnostic tests and consults completed and resulted: Partially Met  -vital signs normal or at patient baseline: Not Met  -tolerating oral intake to maintain hydration: Partially Met   -adequate pain control on oral analgesics: Partially Met   -returns to baseline functional status:  Not Met   -safe disposition plan has been identified: Not Met

## 2021-11-04 NOTE — PLAN OF CARE
Discharge    Patient discharged to home via ambulatory with driving self home.  Care plan note: See POC note    Listed belongings gathered and given to patient (including from security/pharmacy). Yes  Care Plan and Patient education resolved: Yes  Prescriptions if needed, hard copies sent with patient  NA  Medication Bin checked and emptied on discharge Yes  SW/care coordinator/charge RN aware of discharge: Yes

## 2021-11-04 NOTE — PROGRESS NOTES
"[facility] Progress Note     IMPRESSION:  Acute, recurrent alcoholic pancreatitis  Mild hepatitis, alcohol induced  Hepatomegaly on CT  Alcohol use disorder    AM labs - BUN 3, from 9 on admit  Appreciate chem dep/mental health recs    RECOMMENDATIONS:  No new recs.  May be nearing discharge if can take regular diet today, pain controlled with occasional po narcotic.    Please call with questions.    Eber Rashid, DO   [facility]  Cell [phone]    ________________________________________________________________________      SUBJECTIVE:  Feeling well, tolerating po intake without worsening pain - states pain 2/10 currently.      OBJECTIVE:  BP (!) 140/94 (BP Location: Left arm)   Pulse 80   Temp 99.7  F (37.6  C) (Oral)   Resp 16   Ht 1.753 m (5' 9\")   Wt 77.1 kg (170 lb)   SpO2 97%   BMI 25.10 kg/m    Temp (24hrs), Av.5  F (36.9  C), Min:97.3  F (36.3  C), Max:99.7  F (37.6  C)    Patient Vitals for the past 72 hrs:   Weight   21 0451 77.1 kg (170 lb)       Intake/Output Summary (Last 24 hours) at 2021 1047  Last data filed at 2021 0913  Gross per 24 hour   Intake 3415 ml   Output 2050 ml   Net 1365 ml        PHYSICAL EXAM  GEN: Alert, oriented x3, communicative and in NAD.    ABD: ND, +BS, minimally TTP    Additional Data:  I have reviewed the patient's new clinical lab results:     Recent Labs   Lab Test 21  0458 21  0854 21  1102   WBC 9.2 9.0 10.0   HGB 16.5 13.4 14.0   MCV 96 98 98    175 167     Recent Labs   Lab Test 21  0850 21  0458 21  0854   POTASSIUM 3.5 3.5 3.4   CHLORIDE 102 102 104   CO2 30 29 30   BUN 3* 9 5*   ANIONGAP 4 5 4     Recent Labs   Lab Test 21  0850 21  0458 21  0854 01/10/21  1140 21  1948   ALBUMIN 3.7 4.4 3.3*   < >  --    BILITOTAL 1.1 1.2 1.3   < >  --    ALT 61 80* 58   < >  --    AST 33 54* 28   < >  --    PROTEIN  --   --   --   --  Negative   LIPASE 9,002* " 27,786* 1,091*   < >  --     < > = values in this interval not displayed.

## 2021-11-04 NOTE — DISCHARGE SUMMARY
Discharge Summary  Hospitalist    Date of Admission:  11/3/2021  Date of Discharge:  11/4/2021  Discharging Provider: Rita Louise MD  Date of Service (when I saw the patient): 11/04/21    Discharge Diagnoses   Acute alcoholic pancreatitis    History of Present Illness   Please refer H & P for details.      Hospital Course     Lloyd Dillon is a 26 year old male admitted on 11/3/2021. He presents to the emergency department with predominantly back pain, though also with some nausea and epigastric discomfort similar to prior episodes of pancreatitis and is found to have an elevated lipase.     Acute alcoholic pancreatitis: Very likely alcohol induced pancreatitis as patient has a history of this x2, last in August while in [place].  Does not describe a significant increase in alcohol use prior to pancreatitis episode, has actually decreased use since August.  As below, drinks approximately 3 alcoholic beverages per sitting approximately one half of the days of the month.  Drink October 30 prior to onset of symptoms October 31.  Still able to tolerate oral intake up until 11/2 evening  Lipase 27,076, ALT 80, AST 54, total bili 1.2 at admission.  Normal hemogram.  -Received 2 L normal saline in the emergency department;  additional 2 L lactated Ringer's over 2 hours for aggressive volume resuscitation early in course of pancreatitis  -Patient has access to an online portal of his results with pancreatitis on CT, no gallstones on upper quadrant ultrasound.  -Note triglycerides in the 50s during January admission; not repeating lipid panel at this time  -As needed oral oxycodone, as needed IV Toradol, low-dose IV Dilaudid for pain  -Gastroenterology consulted.  Agreed with conservative management with n.p.o., aggressive IVF, pain control.  The next day patient tolerated diet advancement to regular diet.  He had no pain.  Minimal use of narcotics.  Lipase improved to 9000 range.  Patient is discharged home in  stable condition.  With follow-up with PCP.  Strongly encouraged alcohol cessation.     Alcohol use disorder: Patient now with a third episode of pancreatitis attributed to alcohol use.  He reports no history of alcohol withdrawal.  Last drink was 10/30/2021 and reports that he drinks approximately 3 alcoholic beverages per sitting approximately one half of the days of the month.  -Chemical dependency consulted.  Resources for outpatient treatment provided.  -Psychiatry consult placed.  Outpatient treatment resources provided.  -Monitor for withdrawal, no history of such.  No evidence of withdrawal in the hospital.  -Continue prior to admission folic acid and thiamine     Depression with anxiety: Was seen by psychiatry during January admission and initiated on Remeron.  No longer taking this medication per his report.     GERD  -IV PPI while NPO.  Resumed PTA PPI at discharge.       Rita Louise MD, MD      Pending Results   These results will be followed up by Hospitalist team.  Unresulted Labs Ordered in the Past 30 Days of this Admission     No orders found for last 31 day(s).          Code Status   Full Code       Primary Care Physician   Physician No Ref-Primary    Follow-ups Needed After Discharge   Follow-up Appointments     Follow-up and recommended labs and tests       Follow up with primary care provider, Physician No Ref-Primary, within 7   days for hospital follow- up.  No follow up labs or test are needed.             Physical Exam   Temp: 98.9  F (37.2  C) Temp src: Oral BP: (!) 137/91 Pulse: 80   Resp: 16 SpO2: 97 % O2 Device: None (Room air)    Vitals:    11/03/21 0451   Weight: 77.1 kg (170 lb)     Vital Signs with Ranges  Temp:  [98  F (36.7  C)-99.7  F (37.6  C)] 98.9  F (37.2  C)  Pulse:  [70-83] 80  Resp:  [16-18] 16  BP: (137-155)/() 137/91  SpO2:  [97 %-100 %] 97 %  I/O last 3 completed shifts:  In: 2415 [P.O.:350; I.V.:2065]  Out: 650 [Urine:650]    Constitutional: Alert,  cooperative, no apparent distress  Respiratory: Non labored breathing, clear to auscultation bilaterally, no crackles or wheezing  Cardiovascular: Regular rate and rhythm, no murmurs, no edema  GI: Normal bowel sounds, soft, non-distended, non-tender  Skin: No obvious rash  Neuro: Alert, engages in appropriate conversation, fluent speech, moving all extremities, no facial asymmetry  Psych: Calm and pleasant, no obvious anxiety/ depression      Discharge Disposition   Discharged to home  Condition at discharge: Stable    Consultations This Hospital Stay   PSYCHIATRY IP CONSULT  CHEMICAL DEPENDENCY IP CONSULT  GASTROENTEROLOGY IP CONSULT    Time Spent on this Encounter   IRita MD, personally saw the patient today and spent lesser than 30 minutes discharging this patient.    Discharge Orders      Reason for your hospital stay    You were hospitalized with acute alcoholic pancreatitis.     Follow-up and recommended labs and tests     Follow up with primary care provider, Physician No Ref-Primary, within 7 days for hospital follow- up.  No follow up labs or test are needed.     Activity    Your activity upon discharge: activity as tolerated     Discharge Instructions    You are strongly advised to abstain from any alcohol use.     Diet    Follow this diet upon discharge: Orders Placed This Encounter      Regular Diet Adult     Discharge Medications   Current Discharge Medication List      CONTINUE these medications which have NOT CHANGED    Details   Cyanocobalamin (B-12 PO) Take 1 tablet by mouth daily      folic acid (FOLVITE) 1 MG tablet Take 1 tablet (1 mg) by mouth daily  Qty:      Associated Diagnoses: Acute pancreatitis, unspecified complication status, unspecified pancreatitis type      multivitamin w/minerals (THERA-VIT-M) tablet Take 1 tablet by mouth daily      omeprazole (PRILOSEC) 20 MG DR capsule Take 20 mg by mouth daily      thiamine (B-1) 100 MG tablet Take 1 tablet (100 mg) by mouth  daily  Qty:      Associated Diagnoses: Acute pancreatitis, unspecified complication status, unspecified pancreatitis type      zinc gluconate 50 MG tablet Take 50 mg by mouth daily           Allergies   No Known Allergies  Data   Most Recent 3 CBC's:  Recent Labs   Lab Test 11/03/21  0458 01/12/21  0854 01/11/21  1102   WBC 9.2 9.0 10.0   HGB 16.5 13.4 14.0   MCV 96 98 98    175 167      Most Recent 3 BMP's:  Recent Labs   Lab Test 11/04/21  0850 11/03/21  0458 01/12/21  0854    136 138   POTASSIUM 3.5 3.5 3.4   CHLORIDE 102 102 104   CO2 30 29 30   BUN 3* 9 5*   CR 0.70 0.90 0.64*   ANIONGAP 4 5 4   QUANG 9.4 9.4 8.7   * 135* 107*     Most Recent 2 LFT's:  Recent Labs   Lab Test 11/04/21  0850 11/03/21  0458   AST 33 54*   ALT 61 80*   ALKPHOS 78 109   BILITOTAL 1.1 1.2     Most Recent INR's and Anticoagulation Dosing History:  Anticoagulation Dose History    There is no flowsheet data to display.       Most Recent 3 Troponin's:No lab results found.  Most Recent Cholesterol Panel:  Recent Labs   Lab Test 01/10/21  1140   TRIG 56     Most Recent 6 Bacteria Isolates From Any Culture (See EPIC Reports for Culture Details):No lab results found.  Most Recent TSH, T4 and A1c Labs:No lab results found.    Results for orders placed or performed during the hospital encounter of 01/09/21   Abdomen US, limited (RUQ only)    Narrative    US ABDOMEN LIMITED 1/9/2021 4:28 PM    CLINICAL HISTORY: Pancreatitis.    TECHNIQUE: Limited abdominal ultrasound.    COMPARISON: None.    FINDINGS:    GALLBLADDER: The gallbladder is normal. No gallstones, wall  thickening, or pericholecystic fluid. Negative sonographic Acosta's  sign.    BILE DUCTS: There is no biliary dilatation. The common duct measures 3  mm.    LIVER: The liver is increased in echogenicity without focal mass.     RIGHT KIDNEY: Unremarkable.    PANCREAS: The visualized portions of the pancreas are normal.    No ascites.      Impression    IMPRESSION:  Fatty infiltration of liver. No gallstones or bile duct  dilatation. Right kidney is unremarkable.    MAURI KAUFFMAN MD   CT Abdomen Pelvis w Contrast    Narrative    CT ABDOMEN PELVIS W CONTRAST 1/9/2021 10:01 PM    CLINICAL HISTORY: Abd pain, acute, generalized; acute pancreatitis,  worsening abdominal pain.    TECHNIQUE: CT scan of the abdomen and pelvis was performed following  injection of IV contrast. Multiplanar reformats were obtained. Dose  reduction techniques were used.  CONTRAST: 81 mL Isovue-[id number]    COMPARISON: Abdominal ultrasound 1/9/2021.    FINDINGS:   LOWER CHEST: Normal.    HEPATOBILIARY: Fatty infiltration of liver. Gallbladder is  unremarkable.    PANCREAS: Significant surrounding pancreatic inflammatory changes are  present with areas of mesenteric infiltrative changes and mild  ascites. Findings are consistent with acute pancreatitis.    SPLEEN: Normal.    ADRENAL GLANDS: Normal.    KIDNEYS/BLADDER: Kidneys are unremarkable. No hydronephrosis.  Circumferential bladder wall thickening is present. UTI or changes  related to neurogenic bladder are possible.    BOWEL: No bowel obstruction, diverticulitis or appendicitis.    LYMPH NODES: No enlarged abdominal or pelvic lymph nodes.    VASCULATURE: Unremarkable.    PELVIC ORGANS: Prostate gland appears normal in size. Rectum is  unremarkable. Moderate amount of free pelvic fluid is present.    ADDITIONAL FINDINGS: None.    MUSCULOSKELETAL: Normal.      Impression    IMPRESSION:   1.  Acute pancreatitis with pancreatic ascites and inflammatory  changes in the anterior abdominal mesentery.    2.  Circumferential bladder wall thickening possibly UTI. Neurogenic  bladder can appear similar. No hydronephrosis.    3.  Fatty infiltration of the liver.    MAURI KAUFFMAN MD

## 2021-11-04 NOTE — PLAN OF CARE
Summary: ETOH Pancreatitis   DATE & TIME: 11/4/21  1330  Cognitive Concerns/ Orientation : A & O  BEHAVIOR & AGGRESSION TOOL COLOR: Green  CIWA SCORE: NA  ABNL VS/O2: BP elevated, other VSS on RA  MOBILITY: Ind, Pt has been up walking in halls today.  PAIN MANAGEMENT: Mild abd pain mid upper abd, rates at 2, does not radiate to back today.  DIET: Pt will try regular diet this afternoon, tolerating liquids well.  BOWEL/BLADDER: continent  ABNL LAB/BG: Lipase much improved, 9,002.  DRAIN/DEVICES: IV fluids infusing at 150cc/hr.  TELEMETRY RHYTHM: NA  SKIN: Intact  TESTS/PROCEDURES: Had CT which confirmed pancreatitis.  D/C DAY/GOALS/PLACE: Possibly later today if tolerates regular diet and pain level does not increase.  OTHER IMPORTANT INFO: Last drink was 10/30, developed abd pain 10/31.

## 2022-03-10 ENCOUNTER — APPOINTMENT (OUTPATIENT)
Dept: URBAN - METROPOLITAN AREA CLINIC 256 | Age: 27
Setting detail: DERMATOLOGY
End: 2022-03-12

## 2022-03-10 VITALS — WEIGHT: 170 LBS | RESPIRATION RATE: 16 BRPM | HEIGHT: 69 IN

## 2022-03-10 DIAGNOSIS — Q819 OTHER SPECIFIED ANOMALIES OF SKIN: ICD-10-CM

## 2022-03-10 DIAGNOSIS — Q826 OTHER SPECIFIED ANOMALIES OF SKIN: ICD-10-CM

## 2022-03-10 DIAGNOSIS — L73.1 PSEUDOFOLLICULITIS BARBAE: ICD-10-CM

## 2022-03-10 DIAGNOSIS — Q828 OTHER SPECIFIED ANOMALIES OF SKIN: ICD-10-CM

## 2022-03-10 PROBLEM — L85.8 OTHER SPECIFIED EPIDERMAL THICKENING: Status: ACTIVE | Noted: 2022-03-10

## 2022-03-10 PROCEDURE — OTHER PRESCRIPTION SAMPLES PROVIDED: OTHER

## 2022-03-10 PROCEDURE — OTHER MIPS QUALITY: OTHER

## 2022-03-10 PROCEDURE — OTHER COUNSELING: OTHER

## 2022-03-10 PROCEDURE — OTHER PRESCRIPTION: OTHER

## 2022-03-10 PROCEDURE — 99203 OFFICE O/P NEW LOW 30 MIN: CPT

## 2022-03-10 PROCEDURE — OTHER ADDITIONAL NOTES: OTHER

## 2022-03-10 RX ORDER — CLINDAMYCIN PHOSPHATE 10 MG/ML
1% LOTION TOPICAL QD-BID
Qty: 60 | Refills: 1 | Status: ERX | COMMUNITY
Start: 2022-03-10

## 2022-03-10 ASSESSMENT — LOCATION SIMPLE DESCRIPTION DERM
LOCATION SIMPLE: LEFT CHEEK
LOCATION SIMPLE: RIGHT CHEEK
LOCATION SIMPLE: LEFT LIP

## 2022-03-10 ASSESSMENT — LOCATION DETAILED DESCRIPTION DERM
LOCATION DETAILED: RIGHT INFERIOR MEDIAL BUCCAL CHEEK
LOCATION DETAILED: LEFT LOWER CUTANEOUS LIP
LOCATION DETAILED: LEFT INFERIOR MEDIAL BUCCAL CHEEK

## 2022-03-10 ASSESSMENT — LOCATION ZONE DERM
LOCATION ZONE: FACE
LOCATION ZONE: LIP

## 2022-03-10 NOTE — PROCEDURE: ADDITIONAL NOTES
Additional Notes: Recommend electric beard shaver machine to avoid irritation.
Render Risk Assessment In Note?: no
Detail Level: Simple

## 2022-03-10 NOTE — HPI: ACNE (PATIENT REPORTED)
Where Is Your Acne Located?: Face
List Over The Counter Products You Are Currently On (Separate Each Name With A Comma):: Moisturizers and facial cleansers
Additional Comments (Use Complete Sentences): Tried topical Rx (not remember the name) years ago.

## 2022-03-10 NOTE — HPI: RASH
What Type Of Note Output Would You Prefer (Optional)?: Standard Output
How Severe Is Your Rash?: mild
Is This A New Presentation, Or A Follow-Up?: Rash
Additional History: Tried OTC Honey skin products.

## 2022-04-17 ENCOUNTER — HEALTH MAINTENANCE LETTER (OUTPATIENT)
Age: 27
End: 2022-04-17

## 2022-10-23 ENCOUNTER — HEALTH MAINTENANCE LETTER (OUTPATIENT)
Age: 27
End: 2022-10-23

## 2023-06-01 ENCOUNTER — HEALTH MAINTENANCE LETTER (OUTPATIENT)
Age: 28
End: 2023-06-01

## 2024-06-02 ENCOUNTER — HEALTH MAINTENANCE LETTER (OUTPATIENT)
Age: 29
End: 2024-06-02